# Patient Record
Sex: FEMALE | Race: BLACK OR AFRICAN AMERICAN | Employment: FULL TIME
[De-identification: names, ages, dates, MRNs, and addresses within clinical notes are randomized per-mention and may not be internally consistent; named-entity substitution may affect disease eponyms.]

---

## 2017-01-20 ENCOUNTER — TELEPHONE (OUTPATIENT)
Dept: PULMONOLOGY | Facility: CLINIC | Age: 42
End: 2017-01-20

## 2017-03-29 ENCOUNTER — TELEPHONE (OUTPATIENT)
Dept: BARIATRICS/WEIGHT MGMT | Age: 42
End: 2017-03-29

## 2017-09-14 ENCOUNTER — HOSPITAL ENCOUNTER (EMERGENCY)
Age: 42
Discharge: HOME | End: 2017-09-15
Attending: EMERGENCY MEDICINE
Payer: COMMERCIAL

## 2017-09-14 VITALS
SYSTOLIC BLOOD PRESSURE: 150 MMHG | WEIGHT: 191.2 LBS | OXYGEN SATURATION: 98 % | HEIGHT: 64 IN | HEART RATE: 74 BPM | TEMPERATURE: 99 F | DIASTOLIC BLOOD PRESSURE: 85 MMHG | BODY MASS INDEX: 32.64 KG/M2 | RESPIRATION RATE: 18 BRPM

## 2017-09-14 DIAGNOSIS — R10.9 ABDOMINAL PAIN, UNSPECIFIED LOCATION: ICD-10-CM

## 2017-09-14 DIAGNOSIS — K52.9 GASTROENTERITIS: ICD-10-CM

## 2017-09-14 DIAGNOSIS — R51.9 HEADACHE, UNSPECIFIED HEADACHE TYPE: Primary | ICD-10-CM

## 2017-09-14 LAB
CHP ED QC CHECK: YES
PREGNANCY TEST URINE, POC: NEGATIVE

## 2017-09-14 PROCEDURE — 80048 BASIC METABOLIC PNL TOTAL CA: CPT

## 2017-09-14 PROCEDURE — 2500000003 HC RX 250 WO HCPCS: Performed by: NURSE PRACTITIONER

## 2017-09-14 PROCEDURE — 96375 TX/PRO/DX INJ NEW DRUG ADDON: CPT

## 2017-09-14 PROCEDURE — 85025 COMPLETE CBC W/AUTO DIFF WBC: CPT

## 2017-09-14 PROCEDURE — 99284 EMERGENCY DEPT VISIT MOD MDM: CPT

## 2017-09-14 PROCEDURE — 6360000002 HC RX W HCPCS: Performed by: NURSE PRACTITIONER

## 2017-09-14 PROCEDURE — S0028 INJECTION, FAMOTIDINE, 20 MG: HCPCS | Performed by: NURSE PRACTITIONER

## 2017-09-14 PROCEDURE — 2580000003 HC RX 258: Performed by: NURSE PRACTITIONER

## 2017-09-14 PROCEDURE — 81001 URINALYSIS AUTO W/SCOPE: CPT

## 2017-09-14 PROCEDURE — 84703 CHORIONIC GONADOTROPIN ASSAY: CPT

## 2017-09-14 PROCEDURE — 96374 THER/PROPH/DIAG INJ IV PUSH: CPT

## 2017-09-14 RX ORDER — ONDANSETRON 2 MG/ML
8 INJECTION INTRAMUSCULAR; INTRAVENOUS ONCE
Status: COMPLETED | OUTPATIENT
Start: 2017-09-14 | End: 2017-09-14

## 2017-09-14 RX ORDER — 0.9 % SODIUM CHLORIDE 0.9 %
1000 INTRAVENOUS SOLUTION INTRAVENOUS ONCE
Status: COMPLETED | OUTPATIENT
Start: 2017-09-14 | End: 2017-09-15

## 2017-09-14 RX ADMIN — ONDANSETRON 8 MG: 2 INJECTION INTRAMUSCULAR; INTRAVENOUS at 23:48

## 2017-09-14 RX ADMIN — FAMOTIDINE 20 MG: 10 INJECTION, SOLUTION INTRAVENOUS at 23:49

## 2017-09-14 RX ADMIN — SODIUM CHLORIDE 1000 ML: 9 INJECTION, SOLUTION INTRAVENOUS at 23:48

## 2017-09-14 ASSESSMENT — ENCOUNTER SYMPTOMS
BACK PAIN: 0
SHORTNESS OF BREATH: 0
ABDOMINAL PAIN: 1
DIARRHEA: 1
NAUSEA: 1
COUGH: 0
VOMITING: 1
CONSTIPATION: 0
PHOTOPHOBIA: 0
SINUS PRESSURE: 0

## 2017-09-14 ASSESSMENT — PAIN SCALES - GENERAL: PAINLEVEL_OUTOF10: 8

## 2017-09-14 ASSESSMENT — PAIN DESCRIPTION - PAIN TYPE: TYPE: ACUTE PAIN

## 2017-09-14 ASSESSMENT — PAIN DESCRIPTION - LOCATION: LOCATION: ABDOMEN;HEAD

## 2017-09-14 ASSESSMENT — PAIN DESCRIPTION - ORIENTATION: ORIENTATION: MID

## 2017-09-14 ASSESSMENT — PAIN DESCRIPTION - DESCRIPTORS: DESCRIPTORS: ACHING;DISCOMFORT

## 2017-09-15 LAB
-: ABNORMAL
ABSOLUTE EOS #: 0.1 K/UL (ref 0–0.4)
ABSOLUTE LYMPH #: 1.5 K/UL (ref 1–4.8)
ABSOLUTE MONO #: 0.6 K/UL (ref 0.2–0.8)
AMORPHOUS: ABNORMAL
ANION GAP SERPL CALCULATED.3IONS-SCNC: 15 MMOL/L (ref 9–17)
BACTERIA: ABNORMAL
BASOPHILS # BLD: 0 %
BASOPHILS ABSOLUTE: 0 K/UL (ref 0–0.2)
BILIRUBIN URINE: NEGATIVE
BUN BLDV-MCNC: 14 MG/DL (ref 6–20)
BUN/CREAT BLD: 16 (ref 9–20)
CALCIUM SERPL-MCNC: 8.7 MG/DL (ref 8.6–10.4)
CASTS UA: ABNORMAL /LPF
CHLORIDE BLD-SCNC: 102 MMOL/L (ref 98–107)
CO2: 21 MMOL/L (ref 20–31)
COLOR: YELLOW
COMMENT UA: ABNORMAL
CREAT SERPL-MCNC: 0.89 MG/DL (ref 0.5–0.9)
CRYSTALS, UA: ABNORMAL /HPF
DIFFERENTIAL TYPE: ABNORMAL
EOSINOPHILS RELATIVE PERCENT: 2 %
EPITHELIAL CELLS UA: ABNORMAL /HPF
GFR AFRICAN AMERICAN: >60 ML/MIN
GFR NON-AFRICAN AMERICAN: >60 ML/MIN
GFR SERPL CREATININE-BSD FRML MDRD: NORMAL ML/MIN/{1.73_M2}
GFR SERPL CREATININE-BSD FRML MDRD: NORMAL ML/MIN/{1.73_M2}
GLUCOSE BLD-MCNC: 89 MG/DL (ref 70–99)
GLUCOSE URINE: NEGATIVE
HCG, PREGNANCY URINE (POC): NEGATIVE
HCT VFR BLD CALC: 34.5 % (ref 36–46)
HEMOGLOBIN: 11.9 G/DL (ref 12–16)
KETONES, URINE: NEGATIVE
LEUKOCYTE ESTERASE, URINE: NEGATIVE
LYMPHOCYTES # BLD: 31 %
MCH RBC QN AUTO: 26.9 PG (ref 26–34)
MCHC RBC AUTO-ENTMCNC: 34.4 G/DL (ref 31–37)
MCV RBC AUTO: 78.1 FL (ref 80–100)
MONOCYTES # BLD: 13 %
MUCUS: ABNORMAL
NITRITE, URINE: NEGATIVE
OTHER OBSERVATIONS UA: ABNORMAL
PDW BLD-RTO: 13.4 % (ref 11.5–14.5)
PH UA: 5.5 (ref 5–8)
PLATELET # BLD: 233 K/UL (ref 130–400)
PLATELET ESTIMATE: ABNORMAL
PMV BLD AUTO: ABNORMAL FL (ref 6–12)
POTASSIUM SERPL-SCNC: 3.8 MMOL/L (ref 3.7–5.3)
PROTEIN UA: ABNORMAL
RBC # BLD: 4.42 M/UL (ref 4–5.2)
RBC # BLD: ABNORMAL 10*6/UL
RBC UA: ABNORMAL /HPF (ref 0–2)
RENAL EPITHELIAL, UA: ABNORMAL /HPF
SEG NEUTROPHILS: 54 %
SEGMENTED NEUTROPHILS ABSOLUTE COUNT: 2.7 K/UL (ref 1.8–7.7)
SODIUM BLD-SCNC: 138 MMOL/L (ref 135–144)
SPECIFIC GRAVITY UA: 1.04 (ref 1–1.03)
TRICHOMONAS: ABNORMAL
TURBIDITY: CLEAR
URINE HGB: NEGATIVE
UROBILINOGEN, URINE: NORMAL
WBC # BLD: 4.9 K/UL (ref 3.5–11)
WBC # BLD: ABNORMAL 10*3/UL
WBC UA: ABNORMAL /HPF (ref 0–5)
YEAST: ABNORMAL

## 2017-09-15 PROCEDURE — 87086 URINE CULTURE/COLONY COUNT: CPT

## 2017-09-15 PROCEDURE — 6360000002 HC RX W HCPCS: Performed by: EMERGENCY MEDICINE

## 2017-09-15 PROCEDURE — 96375 TX/PRO/DX INJ NEW DRUG ADDON: CPT

## 2017-09-15 RX ORDER — DIPHENHYDRAMINE HYDROCHLORIDE 50 MG/ML
25 INJECTION INTRAMUSCULAR; INTRAVENOUS ONCE
Status: COMPLETED | OUTPATIENT
Start: 2017-09-15 | End: 2017-09-15

## 2017-09-15 RX ORDER — ONDANSETRON 4 MG/1
4 TABLET, FILM COATED ORAL EVERY 8 HOURS PRN
Qty: 20 TABLET | Refills: 0 | Status: SHIPPED | OUTPATIENT
Start: 2017-09-15 | End: 2017-09-22 | Stop reason: SDUPTHER

## 2017-09-15 RX ORDER — LOPERAMIDE HYDROCHLORIDE 2 MG/1
2 CAPSULE ORAL 4 TIMES DAILY PRN
Qty: 10 CAPSULE | Refills: 0 | Status: SHIPPED | OUTPATIENT
Start: 2017-09-15 | End: 2017-09-25

## 2017-09-15 RX ORDER — METOCLOPRAMIDE HYDROCHLORIDE 5 MG/ML
10 INJECTION INTRAMUSCULAR; INTRAVENOUS ONCE
Status: COMPLETED | OUTPATIENT
Start: 2017-09-15 | End: 2017-09-15

## 2017-09-15 RX ORDER — ONDANSETRON 4 MG/1
4 TABLET, ORALLY DISINTEGRATING ORAL EVERY 8 HOURS PRN
Qty: 20 TABLET | Refills: 0 | Status: SHIPPED | OUTPATIENT
Start: 2017-09-15 | End: 2018-02-08 | Stop reason: ALTCHOICE

## 2017-09-15 RX ORDER — KETOROLAC TROMETHAMINE 30 MG/ML
30 INJECTION, SOLUTION INTRAMUSCULAR; INTRAVENOUS ONCE
Status: COMPLETED | OUTPATIENT
Start: 2017-09-15 | End: 2017-09-15

## 2017-09-15 RX ADMIN — METOCLOPRAMIDE 10 MG: 5 INJECTION, SOLUTION INTRAMUSCULAR; INTRAVENOUS at 00:48

## 2017-09-15 RX ADMIN — KETOROLAC TROMETHAMINE 30 MG: 30 INJECTION, SOLUTION INTRAMUSCULAR at 00:47

## 2017-09-15 RX ADMIN — DIPHENHYDRAMINE HYDROCHLORIDE 25 MG: 50 INJECTION, SOLUTION INTRAMUSCULAR; INTRAVENOUS at 00:48

## 2017-09-15 ASSESSMENT — PAIN SCALES - GENERAL
PAINLEVEL_OUTOF10: 4
PAINLEVEL_OUTOF10: 2

## 2017-09-16 LAB
CULTURE: NORMAL
CULTURE: NORMAL
Lab: NORMAL
SPECIMEN DESCRIPTION: NORMAL
SPECIMEN DESCRIPTION: NORMAL
STATUS: NORMAL

## 2017-09-22 ENCOUNTER — OFFICE VISIT (OUTPATIENT)
Dept: INTERNAL MEDICINE CLINIC | Age: 42
End: 2017-09-22
Payer: COMMERCIAL

## 2017-09-22 ENCOUNTER — HOSPITAL ENCOUNTER (OUTPATIENT)
Age: 42
Setting detail: SPECIMEN
Discharge: HOME | End: 2017-09-22
Payer: COMMERCIAL

## 2017-09-22 VITALS
TEMPERATURE: 98 F | BODY MASS INDEX: 31.89 KG/M2 | HEART RATE: 68 BPM | SYSTOLIC BLOOD PRESSURE: 110 MMHG | OXYGEN SATURATION: 98 % | DIASTOLIC BLOOD PRESSURE: 70 MMHG | HEIGHT: 64 IN | WEIGHT: 186.8 LBS | RESPIRATION RATE: 16 BRPM

## 2017-09-22 DIAGNOSIS — E78.5 DYSLIPIDEMIA: ICD-10-CM

## 2017-09-22 DIAGNOSIS — M54.9 CHRONIC MIDLINE BACK PAIN, UNSPECIFIED BACK LOCATION: ICD-10-CM

## 2017-09-22 DIAGNOSIS — Z98.84 STATUS POST LAPAROSCOPIC SLEEVE GASTRECTOMY: ICD-10-CM

## 2017-09-22 DIAGNOSIS — I10 ESSENTIAL HYPERTENSION: Primary | ICD-10-CM

## 2017-09-22 DIAGNOSIS — G89.29 CHRONIC MIDLINE BACK PAIN, UNSPECIFIED BACK LOCATION: ICD-10-CM

## 2017-09-22 DIAGNOSIS — I10 ESSENTIAL HYPERTENSION: ICD-10-CM

## 2017-09-22 LAB
ABSOLUTE EOS #: 0.1 K/UL (ref 0–0.4)
ABSOLUTE LYMPH #: 1.6 K/UL (ref 1–4.8)
ABSOLUTE MONO #: 0.6 K/UL (ref 0.1–1.2)
ALBUMIN SERPL-MCNC: 4.2 G/DL (ref 3.5–5.2)
ALBUMIN/GLOBULIN RATIO: 1.3 (ref 1–2.5)
ALP BLD-CCNC: 52 U/L (ref 35–104)
ALT SERPL-CCNC: 18 U/L (ref 5–33)
ANION GAP SERPL CALCULATED.3IONS-SCNC: 13 MMOL/L (ref 9–17)
AST SERPL-CCNC: 18 U/L
BASOPHILS # BLD: 1 %
BASOPHILS ABSOLUTE: 0 K/UL (ref 0–0.2)
BILIRUB SERPL-MCNC: 0.33 MG/DL (ref 0.3–1.2)
BUN BLDV-MCNC: 8 MG/DL (ref 6–20)
BUN/CREAT BLD: ABNORMAL (ref 9–20)
CALCIUM SERPL-MCNC: 8.8 MG/DL (ref 8.6–10.4)
CHLORIDE BLD-SCNC: 107 MMOL/L (ref 98–107)
CHOLESTEROL/HDL RATIO: 2.7
CHOLESTEROL: 97 MG/DL
CO2: 22 MMOL/L (ref 20–31)
CREAT SERPL-MCNC: 0.94 MG/DL (ref 0.5–0.9)
CREATININE URINE: 322.4 MG/DL (ref 28–217)
DIFFERENTIAL TYPE: ABNORMAL
EOSINOPHILS RELATIVE PERCENT: 3 %
ESTIMATED AVERAGE GLUCOSE: 103 MG/DL
GFR AFRICAN AMERICAN: >60 ML/MIN
GFR NON-AFRICAN AMERICAN: >60 ML/MIN
GFR SERPL CREATININE-BSD FRML MDRD: ABNORMAL ML/MIN/{1.73_M2}
GFR SERPL CREATININE-BSD FRML MDRD: ABNORMAL ML/MIN/{1.73_M2}
GLUCOSE BLD-MCNC: 82 MG/DL (ref 70–99)
HBA1C MFR BLD: 5.2 % (ref 4–6)
HCT VFR BLD CALC: 34.9 % (ref 36–46)
HDLC SERPL-MCNC: 36 MG/DL
HEMOGLOBIN: 11.5 G/DL (ref 12–16)
HIV AG/AB: NONREACTIVE
LDL CHOLESTEROL: 50 MG/DL (ref 0–130)
LYMPHOCYTES # BLD: 34 %
MCH RBC QN AUTO: 25.9 PG (ref 26–34)
MCHC RBC AUTO-ENTMCNC: 33 G/DL (ref 31–37)
MCV RBC AUTO: 78.4 FL (ref 80–100)
MICROALBUMIN/CREAT 24H UR: <12 MG/L
MICROALBUMIN/CREAT UR-RTO: 4 MCG/MG CREAT
MONOCYTES # BLD: 12 %
PDW BLD-RTO: 15.4 % (ref 12.5–15.4)
PLATELET # BLD: 249 K/UL (ref 140–450)
PLATELET ESTIMATE: ABNORMAL
PMV BLD AUTO: 11.7 FL (ref 6–12)
POTASSIUM SERPL-SCNC: 4 MMOL/L (ref 3.7–5.3)
RBC # BLD: 4.45 M/UL (ref 4–5.2)
RBC # BLD: ABNORMAL 10*6/UL
SEG NEUTROPHILS: 50 %
SEGMENTED NEUTROPHILS ABSOLUTE COUNT: 2.4 K/UL (ref 1.8–7.7)
SODIUM BLD-SCNC: 142 MMOL/L (ref 135–144)
TOTAL PROTEIN: 7.5 G/DL (ref 6.4–8.3)
TRIGL SERPL-MCNC: 57 MG/DL
TSH SERPL DL<=0.05 MIU/L-ACNC: 0.72 MIU/L (ref 0.3–5)
VLDLC SERPL CALC-MCNC: ABNORMAL MG/DL (ref 1–30)
WBC # BLD: 4.8 K/UL (ref 3.5–11)
WBC # BLD: ABNORMAL 10*3/UL

## 2017-09-22 PROCEDURE — 99214 OFFICE O/P EST MOD 30 MIN: CPT | Performed by: FAMILY MEDICINE

## 2017-09-22 RX ORDER — ATORVASTATIN CALCIUM 10 MG/1
10 TABLET, FILM COATED ORAL DAILY
Qty: 30 TABLET | Refills: 1 | Status: SHIPPED | OUTPATIENT
Start: 2017-09-22 | End: 2018-04-23 | Stop reason: ALTCHOICE

## 2017-09-22 RX ORDER — METOPROLOL TARTRATE 50 MG/1
50 TABLET, FILM COATED ORAL DAILY
COMMUNITY
End: 2017-09-22 | Stop reason: SDUPTHER

## 2017-09-22 RX ORDER — PANTOPRAZOLE SODIUM 40 MG/1
40 TABLET, DELAYED RELEASE ORAL DAILY
COMMUNITY
End: 2017-09-22 | Stop reason: SDUPTHER

## 2017-09-22 RX ORDER — PANTOPRAZOLE SODIUM 40 MG/1
40 TABLET, DELAYED RELEASE ORAL DAILY
Qty: 30 TABLET | Refills: 1 | Status: SHIPPED | OUTPATIENT
Start: 2017-09-22 | End: 2018-04-23 | Stop reason: ALTCHOICE

## 2017-09-22 RX ORDER — METOPROLOL TARTRATE 50 MG/1
50 TABLET, FILM COATED ORAL DAILY
Qty: 30 TABLET | Refills: 1 | Status: SHIPPED | OUTPATIENT
Start: 2017-09-22 | End: 2018-04-23 | Stop reason: ALTCHOICE

## 2017-09-22 RX ORDER — ATORVASTATIN CALCIUM 10 MG/1
10 TABLET, FILM COATED ORAL DAILY
COMMUNITY
End: 2017-09-22 | Stop reason: SDUPTHER

## 2017-09-22 ASSESSMENT — ENCOUNTER SYMPTOMS
EYES NEGATIVE: 1
RESPIRATORY NEGATIVE: 1
ALLERGIC/IMMUNOLOGIC NEGATIVE: 1
GASTROINTESTINAL NEGATIVE: 1

## 2017-09-22 ASSESSMENT — PATIENT HEALTH QUESTIONNAIRE - PHQ9
SUM OF ALL RESPONSES TO PHQ QUESTIONS 1-9: 0
2. FEELING DOWN, DEPRESSED OR HOPELESS: 0
SUM OF ALL RESPONSES TO PHQ9 QUESTIONS 1 & 2: 0
1. LITTLE INTEREST OR PLEASURE IN DOING THINGS: 0

## 2017-10-04 ENCOUNTER — TELEPHONE (OUTPATIENT)
Dept: INTERNAL MEDICINE CLINIC | Age: 42
End: 2017-10-04

## 2017-10-04 NOTE — TELEPHONE ENCOUNTER
Patient called regarding the forms from [employer] that we can complete in the office and fax back.

## 2017-10-04 NOTE — TELEPHONE ENCOUNTER
Health Maintenance   Topic Date Due    Cervical cancer screen  10/16/2018 (Originally 10/17/1998)    Flu vaccine (1) 10/17/2018 (Originally 9/1/2017)    DTaP/Tdap/Td vaccine (2 - Td) 06/01/2026    HIV screen  Completed       Hemoglobin A1C (%)   Date Value   09/22/2017 5.2   06/01/2016 5.2   01/15/2016 5.9             ( goal A1C is < 7)   Microalb/Crt. Ratio (mcg/mg creat)   Date Value   09/22/2017 4     LDL Cholesterol (mg/dL)   Date Value   09/22/2017 50     LDL Calculated (mg/dL)   Date Value   07/16/2010 173 (A)       (goal LDL is <100)   AST (U/L)   Date Value   09/22/2017 18     ALT (U/L)   Date Value   09/22/2017 18     BUN (mg/dL)   Date Value   09/22/2017 8     BP Readings from Last 3 Encounters:   09/22/17 110/70   09/14/17 (!) 150/85   07/28/16 118/77          (goal 120/80)    All Future Testing planned in [facility]  Lab Frequency Next Occurrence       Next Visit Date:  Future Appointments  Date Time Provider Acosta Cook   1/22/2018 10:00 AM Darryl Sanchez MD Adult pc [address] [facility]            Patient Active Problem List:     Obesity (BMI 30-39. 9)     Dyslipidemia     Chronic lower back pain     Right knee pain     Osteoarthritis     Chest wall pain     Acute chest pain     Vitamin D deficiency     Essential hypertension     Abnormal EKG     Status post laparoscopic sleeve gastrectomy     Status post laparoscopic sleeve gastrectomy

## 2018-01-22 ENCOUNTER — OFFICE VISIT (OUTPATIENT)
Dept: INTERNAL MEDICINE CLINIC | Age: 43
End: 2018-01-22
Payer: COMMERCIAL

## 2018-01-22 VITALS
HEIGHT: 64 IN | OXYGEN SATURATION: 98 % | SYSTOLIC BLOOD PRESSURE: 120 MMHG | WEIGHT: 203.8 LBS | BODY MASS INDEX: 34.79 KG/M2 | DIASTOLIC BLOOD PRESSURE: 80 MMHG | HEART RATE: 83 BPM | RESPIRATION RATE: 18 BRPM

## 2018-01-22 DIAGNOSIS — R04.0 EPISTAXIS: Primary | ICD-10-CM

## 2018-01-22 DIAGNOSIS — K21.9 GASTROESOPHAGEAL REFLUX DISEASE WITHOUT ESOPHAGITIS: ICD-10-CM

## 2018-01-22 DIAGNOSIS — E66.9 OBESITY (BMI 30-39.9): ICD-10-CM

## 2018-01-22 DIAGNOSIS — E78.5 DYSLIPIDEMIA: ICD-10-CM

## 2018-01-22 DIAGNOSIS — Z98.84 STATUS POST LAPAROSCOPIC SLEEVE GASTRECTOMY: ICD-10-CM

## 2018-01-22 DIAGNOSIS — I10 ESSENTIAL HYPERTENSION: ICD-10-CM

## 2018-01-22 PROCEDURE — 99214 OFFICE O/P EST MOD 30 MIN: CPT | Performed by: FAMILY MEDICINE

## 2018-01-22 ASSESSMENT — ENCOUNTER SYMPTOMS
ALLERGIC/IMMUNOLOGIC NEGATIVE: 1
EYES NEGATIVE: 1
RESPIRATORY NEGATIVE: 1
GASTROINTESTINAL NEGATIVE: 1

## 2018-01-22 NOTE — PROGRESS NOTES
[address]   2. Dyslipidemia     3. Essential hypertension     4. Gastroesophageal reflux disease without esophagitis     5. Obesity (BMI 30-39.9)     6. Status post laparoscopic sleeve gastrectomy             Plan:      43-year-old overweight -American female is presented with subjective complaint of recurrent/chronic epistaxis that responds to nasal pressure. Denies history of presentation to the emergency room or decompensation. Clinical examination is benign. Physical examination is unremarkable. She is advised to refrain from aspirin or any anti-inflammatory drugs. She'll be scheduled with [facility]  She is normotensive random blood pressure less than 130/80. Continue beta blocker that she is tolerating well. Low-fat high-fiber diet, daily moderate exercise, consume less than 2 g of salt a day. Obesity. 2 years status post laparoscopic sleeve gastrectomy. Patient stated that she lost 60 pounds however regained. She is advised to reconnect with her bariatric surgeon. GERD stable on proton pump inhibitor  Hyperlipidemia. pharmacologically treated with statin. Depression screen is negative  She denies tobacco, excessive alcohol or illicit drug use  Available labs reviewed, discussed with patient, question answered. Med list reviewed advised to continue  This note is created with a voice recognition program and while intend to generate a document that accurately reflects the content of the visit, no guarantee can be provided that every mistake has been identified and corrected by editing.

## 2018-02-08 ENCOUNTER — OFFICE VISIT (OUTPATIENT)
Dept: BARIATRICS/WEIGHT MGMT | Age: 43
End: 2018-02-08
Payer: COMMERCIAL

## 2018-02-08 VITALS
BODY MASS INDEX: 33.32 KG/M2 | HEIGHT: 65 IN | WEIGHT: 200 LBS | HEART RATE: 80 BPM | SYSTOLIC BLOOD PRESSURE: 110 MMHG | DIASTOLIC BLOOD PRESSURE: 80 MMHG

## 2018-02-08 DIAGNOSIS — Z98.84 STATUS POST BARIATRIC SURGERY: ICD-10-CM

## 2018-02-08 DIAGNOSIS — I10 ESSENTIAL HYPERTENSION: Primary | ICD-10-CM

## 2018-02-08 PROCEDURE — G8427 DOCREV CUR MEDS BY ELIG CLIN: HCPCS | Performed by: SURGERY

## 2018-02-08 PROCEDURE — 1036F TOBACCO NON-USER: CPT | Performed by: SURGERY

## 2018-02-08 PROCEDURE — 99213 OFFICE O/P EST LOW 20 MIN: CPT | Performed by: SURGERY

## 2018-02-08 PROCEDURE — G8417 CALC BMI ABV UP PARAM F/U: HCPCS | Performed by: SURGERY

## 2018-02-08 PROCEDURE — G8484 FLU IMMUNIZE NO ADMIN: HCPCS | Performed by: SURGERY

## 2018-02-11 NOTE — PROGRESS NOTES
Medical Nutrition Therapy  Metabolic and Bariatric surgery  Annual follow up note    approx 2 years post op        Pt reports: Gaining weight due to working overtime so did not take the time to continue healthy behaviors; was able to identify the things that worked to help support healthy lifestyle and weight management; unable to identify bariatric behaviors that will help her surgery work for her; not taking MVI or calcium; Lowest weight 170lb per pt;         Vitals:   Vitals:    02/08/18 1112   BP: 110/80   Pulse: 80   Weight: 200 lb (90.7 kg)   Height: 5' 5\" (1.651 m)      Body mass index is 33.28 kg/m². Labs reviewed:     Multivitamin/mineral intake:none  Calcium intake:   none  Other:    goals for next visit  I will read the nutrition section of the binder that was provided to me today. I will start taking my MVI and calcium. I will prepare meals for the week on sat/sun. I will only eat foods I bring from home during my work day. Nutrition Assessment:   PES: Inadequate food and beverage intake r/t WLS as evidenced by loss of excess body weight lost 0 lbs over 1. Goals   60-80gm of protein  48-64oz of fluid  Vitamin adherance  Basic adherance to WLS behavious and this document has been scanned into the chart.        [] met     [x]  Not met        Plan:Offered pt closer follow up as desired and she will schedule that on her way out; otherwise follow up in one month     F/u annually         Georges Miller
intact. Psychiatric: The patient has a normal mood and affect. Speech is normal and behavior is normal. Judgment and thought content normal. Cognition and memory are normal.     Assessment & Plan:      1. Essential hypertension    2. Status post bariatric surgery            [] Actigal: [] NA   [x] S/p Cholecystectomy  [] Continue 3 months post-op  [x] Finished    [x] Advance Diet    [x] Daily protein (65-75gm/day)   [x] Take Vitamins   [x] Attend Support Group    [x] Exercise Regularly     [x] Use contraception:    [] NA    [] s/p Hysterectomy   [] s/p Tubal ligation   [] Other:     Meet with dietician    Discussed bariatric diet with patient    Discussed exercise with patient    Check labs    Return in 3 months    Follow up: Return in about 3 months (around 5/8/2018) for Post-Op. Orders placed this encounter:   Orders Placed This Encounter   Procedures    Zinc    Comprehensive Metabolic Panel    TSH without Reflex    Hemoglobin A1C    Vitamin B12 & Folate    Vitamin B1    Vitamin D 25 Hydroxy    Magnesium    Iron and TIBC    Vitamin A    CBC Auto Differential       New Prescriptions:   No orders of the defined types were placed in this encounter. Electronically signed by Bita Lott DO on 2/11/2018 at 5:57 PM    Please note that this chart was generated using voice recognition Dragon dictation software. Although every effort was made to ensure the accuracy of this automated transcription, some errors in transcription may have occurred.

## 2018-02-21 ENCOUNTER — OFFICE VISIT (OUTPATIENT)
Dept: OTOLARYNGOLOGY | Age: 43
End: 2018-02-21
Payer: COMMERCIAL

## 2018-02-21 VITALS
HEIGHT: 64 IN | DIASTOLIC BLOOD PRESSURE: 86 MMHG | HEART RATE: 74 BPM | WEIGHT: 197.8 LBS | BODY MASS INDEX: 33.77 KG/M2 | SYSTOLIC BLOOD PRESSURE: 126 MMHG

## 2018-02-21 DIAGNOSIS — R04.0 EPISTAXIS: Primary | ICD-10-CM

## 2018-02-21 PROCEDURE — 99203 OFFICE O/P NEW LOW 30 MIN: CPT | Performed by: OTOLARYNGOLOGY

## 2018-02-22 ENCOUNTER — NURSE ONLY (OUTPATIENT)
Dept: BARIATRICS/WEIGHT MGMT | Age: 43
End: 2018-02-22

## 2018-02-22 VITALS — WEIGHT: 193 LBS | BODY MASS INDEX: 33.13 KG/M2

## 2018-03-08 ENCOUNTER — TELEPHONE (OUTPATIENT)
Dept: BARIATRICS/WEIGHT MGMT | Age: 43
End: 2018-03-08

## 2018-04-23 ENCOUNTER — HOSPITAL ENCOUNTER (OUTPATIENT)
Age: 43
Discharge: HOME | End: 2018-04-23
Payer: COMMERCIAL

## 2018-04-23 ENCOUNTER — OFFICE VISIT (OUTPATIENT)
Dept: INTERNAL MEDICINE CLINIC | Age: 43
End: 2018-04-23
Payer: COMMERCIAL

## 2018-04-23 VITALS
SYSTOLIC BLOOD PRESSURE: 128 MMHG | OXYGEN SATURATION: 99 % | RESPIRATION RATE: 20 BRPM | WEIGHT: 195 LBS | DIASTOLIC BLOOD PRESSURE: 80 MMHG | BODY MASS INDEX: 32.49 KG/M2 | HEART RATE: 81 BPM | HEIGHT: 65 IN

## 2018-04-23 DIAGNOSIS — Z98.84 STATUS POST BARIATRIC SURGERY: ICD-10-CM

## 2018-04-23 DIAGNOSIS — E78.5 DYSLIPIDEMIA: Primary | ICD-10-CM

## 2018-04-23 DIAGNOSIS — E55.9 VITAMIN D DEFICIENCY: ICD-10-CM

## 2018-04-23 DIAGNOSIS — Z98.84 STATUS POST LAPAROSCOPIC SLEEVE GASTRECTOMY: ICD-10-CM

## 2018-04-23 DIAGNOSIS — I10 ESSENTIAL HYPERTENSION: ICD-10-CM

## 2018-04-23 DIAGNOSIS — M54.9 MUSCULOSKELETAL BACK PAIN: ICD-10-CM

## 2018-04-23 LAB
ABSOLUTE EOS #: 0.08 K/UL (ref 0–0.44)
ABSOLUTE IMMATURE GRANULOCYTE: <0.03 K/UL (ref 0–0.3)
ABSOLUTE LYMPH #: 0.94 K/UL (ref 1.1–3.7)
ABSOLUTE MONO #: 0.41 K/UL (ref 0.1–1.2)
ALBUMIN SERPL-MCNC: 4.1 G/DL (ref 3.5–5.2)
ALBUMIN/GLOBULIN RATIO: 1 (ref 1–2.5)
ALP BLD-CCNC: 57 U/L (ref 35–104)
ALT SERPL-CCNC: 15 U/L (ref 5–33)
ANION GAP SERPL CALCULATED.3IONS-SCNC: 13 MMOL/L (ref 9–17)
AST SERPL-CCNC: 18 U/L
BASOPHILS # BLD: 1 % (ref 0–2)
BASOPHILS ABSOLUTE: <0.03 K/UL (ref 0–0.2)
BILIRUB SERPL-MCNC: 0.37 MG/DL (ref 0.3–1.2)
BUN BLDV-MCNC: 10 MG/DL (ref 6–20)
BUN/CREAT BLD: NORMAL (ref 9–20)
CALCIUM SERPL-MCNC: 9 MG/DL (ref 8.6–10.4)
CHLORIDE BLD-SCNC: 102 MMOL/L (ref 98–107)
CO2: 24 MMOL/L (ref 20–31)
CREAT SERPL-MCNC: 0.86 MG/DL (ref 0.5–0.9)
DIFFERENTIAL TYPE: ABNORMAL
EOSINOPHILS RELATIVE PERCENT: 3 % (ref 1–4)
ESTIMATED AVERAGE GLUCOSE: 108 MG/DL
FOLATE: 19.1 NG/ML
GFR AFRICAN AMERICAN: >60 ML/MIN
GFR NON-AFRICAN AMERICAN: >60 ML/MIN
GFR SERPL CREATININE-BSD FRML MDRD: NORMAL ML/MIN/{1.73_M2}
GFR SERPL CREATININE-BSD FRML MDRD: NORMAL ML/MIN/{1.73_M2}
GLUCOSE BLD-MCNC: 80 MG/DL (ref 70–99)
HBA1C MFR BLD: 5.4 % (ref 4–6)
HCT VFR BLD CALC: 36.9 % (ref 36.3–47.1)
HEMOGLOBIN: 11.6 G/DL (ref 11.9–15.1)
IMMATURE GRANULOCYTES: 0 %
IRON SATURATION: 14 % (ref 20–55)
IRON: 45 UG/DL (ref 37–145)
LYMPHOCYTES # BLD: 32 % (ref 24–43)
MAGNESIUM: 2.1 MG/DL (ref 1.6–2.6)
MCH RBC QN AUTO: 25.5 PG (ref 25.2–33.5)
MCHC RBC AUTO-ENTMCNC: 31.4 G/DL (ref 28.4–34.8)
MCV RBC AUTO: 81.1 FL (ref 82.6–102.9)
MONOCYTES # BLD: 14 % (ref 3–12)
NRBC AUTOMATED: 0 PER 100 WBC
PDW BLD-RTO: 14.1 % (ref 11.8–14.4)
PLATELET # BLD: 224 K/UL (ref 138–453)
PLATELET ESTIMATE: ABNORMAL
PMV BLD AUTO: 11.2 FL (ref 8.1–13.5)
POTASSIUM SERPL-SCNC: 3.7 MMOL/L (ref 3.7–5.3)
RBC # BLD: 4.55 M/UL (ref 3.95–5.11)
RBC # BLD: ABNORMAL 10*6/UL
SEG NEUTROPHILS: 50 % (ref 36–65)
SEGMENTED NEUTROPHILS ABSOLUTE COUNT: 1.47 K/UL (ref 1.5–8.1)
SODIUM BLD-SCNC: 139 MMOL/L (ref 135–144)
TOTAL IRON BINDING CAPACITY: 330 UG/DL (ref 250–450)
TOTAL PROTEIN: 8.2 G/DL (ref 6.4–8.3)
TSH SERPL DL<=0.05 MIU/L-ACNC: 0.49 MIU/L (ref 0.3–5)
UNSATURATED IRON BINDING CAPACITY: 285 UG/DL (ref 112–347)
VITAMIN B-12: 974 PG/ML (ref 232–1245)
VITAMIN D 25-HYDROXY: 11.8 NG/ML (ref 30–100)
WBC # BLD: 2.9 K/UL (ref 3.5–11.3)
WBC # BLD: ABNORMAL 10*3/UL

## 2018-04-23 PROCEDURE — G8427 DOCREV CUR MEDS BY ELIG CLIN: HCPCS | Performed by: FAMILY MEDICINE

## 2018-04-23 PROCEDURE — 82306 VITAMIN D 25 HYDROXY: CPT

## 2018-04-23 PROCEDURE — 36415 COLL VENOUS BLD VENIPUNCTURE: CPT

## 2018-04-23 PROCEDURE — 83550 IRON BINDING TEST: CPT

## 2018-04-23 PROCEDURE — 82607 VITAMIN B-12: CPT

## 2018-04-23 PROCEDURE — 84590 ASSAY OF VITAMIN A: CPT

## 2018-04-23 PROCEDURE — 80053 COMPREHEN METABOLIC PANEL: CPT

## 2018-04-23 PROCEDURE — 84425 ASSAY OF VITAMIN B-1: CPT

## 2018-04-23 PROCEDURE — 83735 ASSAY OF MAGNESIUM: CPT

## 2018-04-23 PROCEDURE — 84443 ASSAY THYROID STIM HORMONE: CPT

## 2018-04-23 PROCEDURE — 84630 ASSAY OF ZINC: CPT

## 2018-04-23 PROCEDURE — 83540 ASSAY OF IRON: CPT

## 2018-04-23 PROCEDURE — 83036 HEMOGLOBIN GLYCOSYLATED A1C: CPT

## 2018-04-23 PROCEDURE — 99213 OFFICE O/P EST LOW 20 MIN: CPT | Performed by: FAMILY MEDICINE

## 2018-04-23 PROCEDURE — 85025 COMPLETE CBC W/AUTO DIFF WBC: CPT

## 2018-04-23 PROCEDURE — 1036F TOBACCO NON-USER: CPT | Performed by: FAMILY MEDICINE

## 2018-04-23 PROCEDURE — 82746 ASSAY OF FOLIC ACID SERUM: CPT

## 2018-04-23 PROCEDURE — G8417 CALC BMI ABV UP PARAM F/U: HCPCS | Performed by: FAMILY MEDICINE

## 2018-04-23 ASSESSMENT — ENCOUNTER SYMPTOMS
ALLERGIC/IMMUNOLOGIC NEGATIVE: 1
GASTROINTESTINAL NEGATIVE: 1
EYES NEGATIVE: 1
RESPIRATORY NEGATIVE: 1

## 2018-04-25 LAB — ZINC: 84 UG/DL (ref 60–120)

## 2018-04-25 RX ORDER — ERGOCALCIFEROL 1.25 MG/1
50000 CAPSULE ORAL WEEKLY
Qty: 8 CAPSULE | Refills: 0 | Status: SHIPPED | OUTPATIENT
Start: 2018-04-25 | End: 2018-06-08 | Stop reason: SDUPTHER

## 2018-04-26 LAB
RETINYL PALMITATE: <0.02 MG/L (ref 0–0.1)
VITAMIN A LEVEL: 0.33 MG/L (ref 0.3–1.2)
VITAMIN A, INTERP: NORMAL

## 2018-04-28 LAB — VITAMIN B1 WHOLE BLOOD: 43 NMOL/L (ref 70–180)

## 2018-05-08 ENCOUNTER — APPOINTMENT (OUTPATIENT)
Dept: GENERAL RADIOLOGY | Age: 43
End: 2018-05-08
Payer: COMMERCIAL

## 2018-05-08 ENCOUNTER — HOSPITAL ENCOUNTER (EMERGENCY)
Age: 43
Discharge: HOME | End: 2018-05-08
Attending: EMERGENCY MEDICINE
Payer: COMMERCIAL

## 2018-05-08 VITALS
TEMPERATURE: 99.8 F | DIASTOLIC BLOOD PRESSURE: 88 MMHG | OXYGEN SATURATION: 100 % | HEART RATE: 75 BPM | BODY MASS INDEX: 32.44 KG/M2 | SYSTOLIC BLOOD PRESSURE: 153 MMHG | HEIGHT: 64 IN | WEIGHT: 190 LBS | RESPIRATION RATE: 20 BRPM

## 2018-05-08 DIAGNOSIS — R07.89 CHEST WALL PAIN: Primary | ICD-10-CM

## 2018-05-08 LAB
ABSOLUTE EOS #: 0.13 K/UL (ref 0–0.44)
ABSOLUTE IMMATURE GRANULOCYTE: <0.03 K/UL (ref 0–0.3)
ABSOLUTE LYMPH #: 1.21 K/UL (ref 1.1–3.7)
ABSOLUTE MONO #: 0.46 K/UL (ref 0.1–1.2)
ANION GAP SERPL CALCULATED.3IONS-SCNC: 8 MMOL/L (ref 9–17)
BASOPHILS # BLD: 1 % (ref 0–2)
BASOPHILS ABSOLUTE: <0.03 K/UL (ref 0–0.2)
BUN BLDV-MCNC: 10 MG/DL (ref 6–20)
BUN/CREAT BLD: ABNORMAL (ref 9–20)
CALCIUM SERPL-MCNC: 8.9 MG/DL (ref 8.6–10.4)
CHLORIDE BLD-SCNC: 108 MMOL/L (ref 98–107)
CO2: 26 MMOL/L (ref 20–31)
CREAT SERPL-MCNC: 0.81 MG/DL (ref 0.5–0.9)
D-DIMER QUANTITATIVE: 0.18 MG/L FEU
DIFFERENTIAL TYPE: ABNORMAL
EKG ATRIAL RATE: 92 BPM
EKG P AXIS: 37 DEGREES
EKG P-R INTERVAL: 140 MS
EKG Q-T INTERVAL: 378 MS
EKG QRS DURATION: 84 MS
EKG QTC CALCULATION (BAZETT): 467 MS
EKG R AXIS: 38 DEGREES
EKG T AXIS: 31 DEGREES
EKG VENTRICULAR RATE: 92 BPM
EOSINOPHILS RELATIVE PERCENT: 4 % (ref 1–4)
GFR AFRICAN AMERICAN: >60 ML/MIN
GFR NON-AFRICAN AMERICAN: >60 ML/MIN
GFR SERPL CREATININE-BSD FRML MDRD: ABNORMAL ML/MIN/{1.73_M2}
GFR SERPL CREATININE-BSD FRML MDRD: ABNORMAL ML/MIN/{1.73_M2}
GLUCOSE BLD-MCNC: 102 MG/DL (ref 70–99)
HCT VFR BLD CALC: 32.2 % (ref 36.3–47.1)
HEMOGLOBIN: 10.1 G/DL (ref 11.9–15.1)
IMMATURE GRANULOCYTES: 0 %
LYMPHOCYTES # BLD: 34 % (ref 24–43)
MCH RBC QN AUTO: 26.3 PG (ref 25.2–33.5)
MCHC RBC AUTO-ENTMCNC: 31.4 G/DL (ref 28.4–34.8)
MCV RBC AUTO: 83.9 FL (ref 82.6–102.9)
MONOCYTES # BLD: 13 % (ref 3–12)
NRBC AUTOMATED: 0 PER 100 WBC
PDW BLD-RTO: 14.3 % (ref 11.8–14.4)
PLATELET # BLD: 212 K/UL (ref 138–453)
PLATELET ESTIMATE: ABNORMAL
PMV BLD AUTO: 11.7 FL (ref 8.1–13.5)
POC TROPONIN I: 0 NG/ML (ref 0–0.1)
POC TROPONIN I: 0 NG/ML (ref 0–0.1)
POC TROPONIN INTERP: NORMAL
POC TROPONIN INTERP: NORMAL
POTASSIUM SERPL-SCNC: 3.8 MMOL/L (ref 3.7–5.3)
RBC # BLD: 3.84 M/UL (ref 3.95–5.11)
RBC # BLD: ABNORMAL 10*6/UL
SEG NEUTROPHILS: 48 % (ref 36–65)
SEGMENTED NEUTROPHILS ABSOLUTE COUNT: 1.7 K/UL (ref 1.5–8.1)
SODIUM BLD-SCNC: 142 MMOL/L (ref 135–144)
WBC # BLD: 3.5 K/UL (ref 3.5–11.3)
WBC # BLD: ABNORMAL 10*3/UL

## 2018-05-08 PROCEDURE — 85025 COMPLETE CBC W/AUTO DIFF WBC: CPT

## 2018-05-08 PROCEDURE — 99285 EMERGENCY DEPT VISIT HI MDM: CPT

## 2018-05-08 PROCEDURE — 85379 FIBRIN DEGRADATION QUANT: CPT

## 2018-05-08 PROCEDURE — 80048 BASIC METABOLIC PNL TOTAL CA: CPT

## 2018-05-08 PROCEDURE — 93005 ELECTROCARDIOGRAM TRACING: CPT

## 2018-05-08 PROCEDURE — 6370000000 HC RX 637 (ALT 250 FOR IP): Performed by: NURSE PRACTITIONER

## 2018-05-08 PROCEDURE — 71046 X-RAY EXAM CHEST 2 VIEWS: CPT

## 2018-05-08 PROCEDURE — 84484 ASSAY OF TROPONIN QUANT: CPT

## 2018-05-08 RX ORDER — IBUPROFEN 800 MG/1
800 TABLET ORAL ONCE
Status: COMPLETED | OUTPATIENT
Start: 2018-05-08 | End: 2018-05-08

## 2018-05-08 RX ORDER — IBUPROFEN 800 MG/1
800 TABLET ORAL EVERY 8 HOURS PRN
Qty: 30 TABLET | Refills: 0 | Status: SHIPPED | OUTPATIENT
Start: 2018-05-08 | End: 2018-08-17 | Stop reason: ALTCHOICE

## 2018-05-08 RX ADMIN — IBUPROFEN 800 MG: 800 TABLET ORAL at 23:36

## 2018-05-08 ASSESSMENT — PAIN DESCRIPTION - LOCATION: LOCATION: CHEST

## 2018-05-08 ASSESSMENT — ENCOUNTER SYMPTOMS
NAUSEA: 0
SHORTNESS OF BREATH: 0
ABDOMINAL PAIN: 0
COUGH: 0
VOMITING: 0

## 2018-05-08 ASSESSMENT — PAIN DESCRIPTION - PAIN TYPE: TYPE: ACUTE PAIN

## 2018-05-08 ASSESSMENT — PAIN SCALES - GENERAL
PAINLEVEL_OUTOF10: 8
PAINLEVEL_OUTOF10: 3

## 2018-05-08 ASSESSMENT — PAIN DESCRIPTION - ORIENTATION: ORIENTATION: LEFT;UPPER

## 2018-05-08 ASSESSMENT — PAIN DESCRIPTION - DESCRIPTORS: DESCRIPTORS: SHARP

## 2018-05-09 ENCOUNTER — OFFICE VISIT (OUTPATIENT)
Dept: BARIATRICS/WEIGHT MGMT | Age: 43
End: 2018-05-09
Payer: COMMERCIAL

## 2018-05-09 VITALS
SYSTOLIC BLOOD PRESSURE: 108 MMHG | HEIGHT: 64 IN | DIASTOLIC BLOOD PRESSURE: 78 MMHG | BODY MASS INDEX: 32.44 KG/M2 | WEIGHT: 190 LBS | RESPIRATION RATE: 20 BRPM | HEART RATE: 56 BPM

## 2018-05-09 DIAGNOSIS — Z98.84 STATUS POST BARIATRIC SURGERY: ICD-10-CM

## 2018-05-09 DIAGNOSIS — E51.9 THIAMINE DEFICIENCY: ICD-10-CM

## 2018-05-09 DIAGNOSIS — E55.9 VITAMIN D DEFICIENCY: ICD-10-CM

## 2018-05-09 DIAGNOSIS — I10 ESSENTIAL HYPERTENSION: Primary | ICD-10-CM

## 2018-05-09 PROCEDURE — 1036F TOBACCO NON-USER: CPT | Performed by: SURGERY

## 2018-05-09 PROCEDURE — G8427 DOCREV CUR MEDS BY ELIG CLIN: HCPCS | Performed by: SURGERY

## 2018-05-09 PROCEDURE — 99213 OFFICE O/P EST LOW 20 MIN: CPT | Performed by: SURGERY

## 2018-05-09 PROCEDURE — G8417 CALC BMI ABV UP PARAM F/U: HCPCS | Performed by: SURGERY

## 2018-05-21 ENCOUNTER — HOSPITAL ENCOUNTER (OUTPATIENT)
Age: 43
Setting detail: SPECIMEN
Discharge: HOME | End: 2018-05-21
Payer: COMMERCIAL

## 2018-05-21 ENCOUNTER — OFFICE VISIT (OUTPATIENT)
Dept: INTERNAL MEDICINE CLINIC | Age: 43
End: 2018-05-21
Payer: COMMERCIAL

## 2018-05-21 VITALS — HEIGHT: 64 IN | BODY MASS INDEX: 31.09 KG/M2 | WEIGHT: 182.1 LBS

## 2018-05-21 DIAGNOSIS — I10 ESSENTIAL HYPERTENSION: ICD-10-CM

## 2018-05-21 DIAGNOSIS — G89.29 CHRONIC LOW BACK PAIN WITHOUT SCIATICA, UNSPECIFIED BACK PAIN LATERALITY: ICD-10-CM

## 2018-05-21 DIAGNOSIS — N76.1 SUBACUTE VAGINITIS: Primary | ICD-10-CM

## 2018-05-21 DIAGNOSIS — Z98.890 HISTORY OF BILATERAL BREAST REDUCTION SURGERY: ICD-10-CM

## 2018-05-21 DIAGNOSIS — E66.9 OBESITY (BMI 30-39.9): ICD-10-CM

## 2018-05-21 DIAGNOSIS — Z98.84 STATUS POST LAPAROSCOPIC SLEEVE GASTRECTOMY: ICD-10-CM

## 2018-05-21 DIAGNOSIS — E55.9 VITAMIN D DEFICIENCY: ICD-10-CM

## 2018-05-21 DIAGNOSIS — M54.50 CHRONIC LOW BACK PAIN WITHOUT SCIATICA, UNSPECIFIED BACK PAIN LATERALITY: ICD-10-CM

## 2018-05-21 DIAGNOSIS — E78.5 DYSLIPIDEMIA: ICD-10-CM

## 2018-05-21 DIAGNOSIS — Z12.4 PAP SMEAR FOR CERVICAL CANCER SCREENING: ICD-10-CM

## 2018-05-21 LAB
CONTROL: NORMAL
PREGNANCY TEST URINE, POC: NEGATIVE

## 2018-05-21 PROCEDURE — 99214 OFFICE O/P EST MOD 30 MIN: CPT | Performed by: FAMILY MEDICINE

## 2018-05-21 PROCEDURE — 1036F TOBACCO NON-USER: CPT | Performed by: FAMILY MEDICINE

## 2018-05-21 PROCEDURE — 81025 URINE PREGNANCY TEST: CPT | Performed by: FAMILY MEDICINE

## 2018-05-21 PROCEDURE — G8427 DOCREV CUR MEDS BY ELIG CLIN: HCPCS | Performed by: FAMILY MEDICINE

## 2018-05-21 PROCEDURE — G8417 CALC BMI ABV UP PARAM F/U: HCPCS | Performed by: FAMILY MEDICINE

## 2018-05-21 ASSESSMENT — ENCOUNTER SYMPTOMS
ALLERGIC/IMMUNOLOGIC NEGATIVE: 1
RESPIRATORY NEGATIVE: 1
GASTROINTESTINAL NEGATIVE: 1
EYES NEGATIVE: 1

## 2018-05-23 LAB
CHLAMYDIA BY THIN PREP: NEGATIVE
N. GONORRHOEAE DNA, THIN PREP: NEGATIVE

## 2018-06-08 ENCOUNTER — OFFICE VISIT (OUTPATIENT)
Dept: FAMILY MEDICINE CLINIC | Age: 43
End: 2018-06-08
Payer: COMMERCIAL

## 2018-06-08 VITALS — BODY MASS INDEX: 32.95 KG/M2 | RESPIRATION RATE: 16 BRPM | WEIGHT: 193 LBS | HEIGHT: 64 IN

## 2018-06-08 DIAGNOSIS — Z12.39 SCREENING FOR BREAST CANCER: ICD-10-CM

## 2018-06-08 DIAGNOSIS — E55.9 VITAMIN D DEFICIENCY: ICD-10-CM

## 2018-06-08 DIAGNOSIS — E66.9 OBESITY (BMI 30-39.9): ICD-10-CM

## 2018-06-08 DIAGNOSIS — B37.9 CANDIDIASIS: Primary | ICD-10-CM

## 2018-06-08 PROCEDURE — 99204 OFFICE O/P NEW MOD 45 MIN: CPT | Performed by: FAMILY MEDICINE

## 2018-06-08 PROCEDURE — G8427 DOCREV CUR MEDS BY ELIG CLIN: HCPCS | Performed by: FAMILY MEDICINE

## 2018-06-08 PROCEDURE — 1036F TOBACCO NON-USER: CPT | Performed by: FAMILY MEDICINE

## 2018-06-08 PROCEDURE — G8417 CALC BMI ABV UP PARAM F/U: HCPCS | Performed by: FAMILY MEDICINE

## 2018-06-08 RX ORDER — PHENTERMINE HYDROCHLORIDE 37.5 MG/1
37.5 TABLET ORAL
Qty: 30 TABLET | Refills: 0 | Status: SHIPPED | OUTPATIENT
Start: 2018-06-08 | End: 2018-07-09 | Stop reason: SDUPTHER

## 2018-06-08 RX ORDER — TOPIRAMATE 25 MG/1
TABLET ORAL
Qty: 80 TABLET | Refills: 0 | Status: SHIPPED | OUTPATIENT
Start: 2018-06-08 | End: 2018-08-17 | Stop reason: ALTCHOICE

## 2018-06-08 RX ORDER — ERGOCALCIFEROL 1.25 MG/1
50000 CAPSULE ORAL WEEKLY
Qty: 8 CAPSULE | Refills: 11 | Status: SHIPPED | OUTPATIENT
Start: 2018-06-08 | End: 2018-11-16 | Stop reason: ALTCHOICE

## 2018-06-08 RX ORDER — NYSTATIN 100000 U/G
CREAM TOPICAL
Qty: 1 TUBE | Refills: 2 | Status: SHIPPED | OUTPATIENT
Start: 2018-06-08 | End: 2020-02-20

## 2018-06-08 ASSESSMENT — PATIENT HEALTH QUESTIONNAIRE - PHQ9
1. LITTLE INTEREST OR PLEASURE IN DOING THINGS: 0
2. FEELING DOWN, DEPRESSED OR HOPELESS: 0
SUM OF ALL RESPONSES TO PHQ9 QUESTIONS 1 & 2: 0
SUM OF ALL RESPONSES TO PHQ QUESTIONS 1-9: 0

## 2018-06-15 LAB — CYTOLOGY REPORT: NORMAL

## 2018-07-06 ENCOUNTER — HOSPITAL ENCOUNTER (OUTPATIENT)
Dept: MAMMOGRAPHY | Age: 43
Discharge: HOME | End: 2018-07-08
Payer: COMMERCIAL

## 2018-07-06 DIAGNOSIS — Z12.39 SCREENING BREAST EXAMINATION: ICD-10-CM

## 2018-07-06 PROCEDURE — 77067 SCR MAMMO BI INCL CAD: CPT

## 2018-07-09 ENCOUNTER — OFFICE VISIT (OUTPATIENT)
Dept: FAMILY MEDICINE CLINIC | Age: 43
End: 2018-07-09
Payer: COMMERCIAL

## 2018-07-09 VITALS
SYSTOLIC BLOOD PRESSURE: 138 MMHG | HEART RATE: 86 BPM | RESPIRATION RATE: 16 BRPM | DIASTOLIC BLOOD PRESSURE: 72 MMHG | HEIGHT: 64 IN | BODY MASS INDEX: 29.88 KG/M2 | WEIGHT: 175 LBS

## 2018-07-09 DIAGNOSIS — E66.9 OBESITY (BMI 30-39.9): ICD-10-CM

## 2018-07-09 DIAGNOSIS — I10 ESSENTIAL HYPERTENSION: Primary | ICD-10-CM

## 2018-07-09 PROCEDURE — 99213 OFFICE O/P EST LOW 20 MIN: CPT | Performed by: FAMILY MEDICINE

## 2018-07-09 RX ORDER — PHENTERMINE HYDROCHLORIDE 37.5 MG/1
37.5 TABLET ORAL
Qty: 30 TABLET | Refills: 0 | Status: SHIPPED | OUTPATIENT
Start: 2018-07-09 | End: 2018-08-08

## 2018-07-09 RX ORDER — TOPIRAMATE 100 MG/1
100 TABLET, FILM COATED ORAL DAILY
Qty: 90 TABLET | Refills: 3 | Status: SHIPPED | OUTPATIENT
Start: 2018-07-09 | End: 2020-02-20

## 2018-07-09 ASSESSMENT — PATIENT HEALTH QUESTIONNAIRE - PHQ9
1. LITTLE INTEREST OR PLEASURE IN DOING THINGS: 0
SUM OF ALL RESPONSES TO PHQ9 QUESTIONS 1 & 2: 0
2. FEELING DOWN, DEPRESSED OR HOPELESS: 0
SUM OF ALL RESPONSES TO PHQ QUESTIONS 1-9: 0

## 2018-08-06 ENCOUNTER — OFFICE VISIT (OUTPATIENT)
Dept: FAMILY MEDICINE CLINIC | Age: 43
End: 2018-08-06
Payer: COMMERCIAL

## 2018-08-06 VITALS
DIASTOLIC BLOOD PRESSURE: 77 MMHG | SYSTOLIC BLOOD PRESSURE: 110 MMHG | HEIGHT: 64 IN | BODY MASS INDEX: 29.19 KG/M2 | HEART RATE: 82 BPM | RESPIRATION RATE: 16 BRPM | WEIGHT: 171 LBS

## 2018-08-06 DIAGNOSIS — E66.9 OBESITY (BMI 30-39.9): Primary | ICD-10-CM

## 2018-08-06 PROCEDURE — 99213 OFFICE O/P EST LOW 20 MIN: CPT | Performed by: FAMILY MEDICINE

## 2018-08-06 RX ORDER — PHENTERMINE HYDROCHLORIDE 37.5 MG/1
37.5 TABLET ORAL
Qty: 30 TABLET | Refills: 0 | Status: SHIPPED | OUTPATIENT
Start: 2018-08-06 | End: 2018-08-17 | Stop reason: ALTCHOICE

## 2018-08-06 NOTE — PROGRESS NOTES
for chest pain and leg swelling. Gastrointestinal: Negative. Negative for abdominal pain and blood in stool. Endocrine: Negative for cold intolerance and polyuria. Genitourinary: Negative for dysuria and hematuria. Musculoskeletal: Negative. Skin: Negative for rash. Allergic/Immunologic: Negative. Neurological: Negative. Negative for dizziness, weakness and numbness. Hematological: Negative. Negative for adenopathy. Does not bruise/bleed easily. Psychiatric/Behavioral: Negative for sleep disturbance, dysphoric mood and  decreased concentration. The patient is not nervous/anxious. Objective:     Physical Exam:     Nursing note and vitals reviewed. /77   Pulse 82   Resp 16   Ht 5' 4\" (1.626 m)   Wt 171 lb (77.6 kg)   Breastfeeding? No   BMI 29.35 kg/m²   Constitutional: She is oriented to person, place, and time. She   appears well-developed and well-nourished. HENT:   Head: Normocephalic and atraumatic. Right Ear: External ear normal. Tympanic membrane is not erythematous. No middle ear effusion. Left Ear: External ear normal. Tympanic membrane is not erythematous. No middle ear effusion. Nose: No mucosal edema. Mouth/Throat: Oropharynx is clear and moist. No posterior oropharyngeal erythema. Eyes: Conjunctivae and EOM are normal. Pupils are equal, round, and reactive to light. Neck: Normal range of motion. Neck supple. No thyromegaly present. Cardiovascular: Normal rate, regular rhythm and normal heart sounds. No murmur heard. Pulmonary/Chest: Effort normal and breath sounds normal. She has no wheezes. Shehas no rales. Abdominal: Soft. Bowel sounds are normal. She exhibits no distension and no mass. There is no tenderness. There is no rebound and no guarding. Genitourinary/Anorectal:deferred  Musculoskeletal: Normal range of motion. She exhibits no edema or tenderness. Lymphadenopathy: She has no cervical adenopathy.    Neurological: She is alert and oriented to person, place, and time. She has normal reflexes. Skin: Skin is warm and dry. No rash noted. Psychiatric: She has a normal mood and affect. Her   behavior is normal.       Assessment:      1. Obesity (BMI 30-39. 9)          Plan:      Call or return to clinic prn if these symptoms worsen or fail to improve as anticipated. I have reviewed the instructions with the patient, answering all questions to her satisfaction. No Follow-up on file. No orders of the defined types were placed in this encounter. Orders Placed This Encounter   Medications    phentermine (ADIPEX-P) 37.5 MG tablet     Sig: Take 1 tablet by mouth every morning (before breakfast) for 30 days. .     Dispense:  30 tablet     Refill:  0    continue to diet and exercise    Electronically signed by Roosevelt Watts DO on 8/6/2018 at 1:33 PM

## 2018-08-17 ENCOUNTER — OFFICE VISIT (OUTPATIENT)
Dept: BARIATRICS/WEIGHT MGMT | Age: 43
End: 2018-08-17
Payer: COMMERCIAL

## 2018-08-17 VITALS
HEIGHT: 65 IN | DIASTOLIC BLOOD PRESSURE: 80 MMHG | HEART RATE: 68 BPM | WEIGHT: 170 LBS | BODY MASS INDEX: 28.32 KG/M2 | SYSTOLIC BLOOD PRESSURE: 130 MMHG

## 2018-08-17 DIAGNOSIS — E51.9 THIAMINE DEFICIENCY: Primary | ICD-10-CM

## 2018-08-17 DIAGNOSIS — Z98.84 STATUS POST BARIATRIC SURGERY: ICD-10-CM

## 2018-08-17 PROCEDURE — 99213 OFFICE O/P EST LOW 20 MIN: CPT | Performed by: SURGERY

## 2018-08-17 RX ORDER — M-VIT,TX,IRON,MINS/CALC/FOLIC 27MG-0.4MG
1 TABLET ORAL DAILY
COMMUNITY
End: 2020-02-20

## 2018-08-19 NOTE — PROGRESS NOTES
Medical Nutrition Therapy  Metabolic and Bariatric surgery  18 months  follow up note         Pt reports:         Vitals:   Vitals:    08/17/18 1300   BP: 130/80   Pulse: 68   Weight: 170 lb (77.1 kg)   Height: 5' 5\" (1.651 m)      Body mass index is 28.29 kg/m². Labs reviewed:     Multivitamin/mineral intake:2 MVI daily pt does not know name  Calcium intake:  none  Other:            Nutrition Assessment:   PES: Inadequate food and beverage intake r/t WLS as evidenced by loss of excess body weight lost 20 lbs over 3 months. Goals   60-80gm of protein  48-64oz of fluid  Vitamin adherance  Basic adherance to WLS behavious and this document has been scanned into the chart.        [] met     [x]  Not met        Plan: add 2 calcium daily   F/u annually         [facility]
Wheeze [] Cough  [] Calf Pain   Gastro-Intestinal Negative for: [x] Heartburn   [x] Reflux   [x] Dysphagia   [x] Melena   [x] BRBPR  [x] Vomiting   [x] Abdominal Pain   [x] Diarrhea  [] Hernia    [x] Constipation  [] Other:     Positive for: [] Heartburn   [] Reflux   [] Dysphagia   [] Melena   [] BRBPR  [] Vomiting   [] Abdominal Pain   [] Diarrhea  [] Hernia    [] Constipation  [] Other:    Muskuloskeletal Negative for: [] Joint pain   [] Back pain   [] Knee Pain   [x] Muscle weakness   [x] Edema [] Other:     Positive for: [] Joint pain   [] Back pain   [] Knee Pain   [] Muscle weakness  [] Edema [] Other:    Neurologic Negative for: [x] Syncope   [x] Insomnia   [] Being treated for depression  [] Other:     Positive for: [] Syncope   [] Insomnia   [] Being treated for depression  [] Other:    Skin Negative for: [x] Wound:   [] Open   [] Draining   [] Incisional     [x] Rash   [] Hair Loss  [] Other:     Positive for: [] Wound:   [] Open   [] Draining    [] Incisional  [] Rash   [] Hair Loss  [] Other:      Physical Exam:  /80   Pulse 68   Ht 5' 5\" (1.651 m)   Wt 170 lb (77.1 kg)   BMI 28.29 kg/m²   Constitutional:  Vital signs are normal. The patient appears well-developed and well-nourished. HEENT:   Head: Normocephalic. Atraumatic  Eyes: pupils are equal and reactive. No scleral icterus is present. Neck: No mass and no thyromegaly present. Cardiovascular: Normal rate, regular rhythm, S1 normal and S2 normal.  Radial pulses present   Pulmonary/Chest: Effort normal and breath sounds normal. No retractions  Abdominal: Soft. Normal appearance. There is no organomegaly. No tenderness. There is no rigidity, no rebound, no guarding and no Landaverde's sign. Musculoskeletal:        Right lower leg: Normal. No tenderness and no edema. Left lower leg: Normal. No tenderness and no edema. Neurological: The patient is alert and oriented.  Moving all 4 extremities, sensation grossly intact

## 2018-11-16 ENCOUNTER — OFFICE VISIT (OUTPATIENT)
Dept: BARIATRICS/WEIGHT MGMT | Age: 43
End: 2018-11-16
Payer: COMMERCIAL

## 2018-11-16 VITALS
RESPIRATION RATE: 20 BRPM | BODY MASS INDEX: 30.32 KG/M2 | WEIGHT: 182 LBS | SYSTOLIC BLOOD PRESSURE: 130 MMHG | HEIGHT: 65 IN | HEART RATE: 78 BPM | DIASTOLIC BLOOD PRESSURE: 70 MMHG

## 2018-11-16 DIAGNOSIS — Z98.84 STATUS POST BARIATRIC SURGERY: ICD-10-CM

## 2018-11-16 DIAGNOSIS — E51.9 THIAMINE DEFICIENCY: Primary | ICD-10-CM

## 2018-11-16 PROCEDURE — 99213 OFFICE O/P EST LOW 20 MIN: CPT | Performed by: SURGERY

## 2019-02-20 ENCOUNTER — OFFICE VISIT (OUTPATIENT)
Dept: FAMILY MEDICINE CLINIC | Age: 44
End: 2019-02-20
Payer: COMMERCIAL

## 2019-02-20 VITALS
TEMPERATURE: 97.7 F | WEIGHT: 198 LBS | RESPIRATION RATE: 16 BRPM | BODY MASS INDEX: 32.99 KG/M2 | DIASTOLIC BLOOD PRESSURE: 82 MMHG | HEART RATE: 86 BPM | SYSTOLIC BLOOD PRESSURE: 136 MMHG | HEIGHT: 65 IN

## 2019-02-20 DIAGNOSIS — J06.9 ACUTE URI: Primary | ICD-10-CM

## 2019-02-20 DIAGNOSIS — E66.9 OBESITY (BMI 30-39.9): ICD-10-CM

## 2019-02-20 LAB
INFLUENZA A ANTIBODY: NORMAL
INFLUENZA B ANTIBODY: NORMAL

## 2019-02-20 PROCEDURE — 99213 OFFICE O/P EST LOW 20 MIN: CPT | Performed by: FAMILY MEDICINE

## 2019-02-20 PROCEDURE — 87804 INFLUENZA ASSAY W/OPTIC: CPT | Performed by: FAMILY MEDICINE

## 2019-02-20 RX ORDER — GUAIFENESIN 600 MG/1
600 TABLET, EXTENDED RELEASE ORAL 2 TIMES DAILY
Qty: 120 TABLET | Refills: 5 | Status: SHIPPED | OUTPATIENT
Start: 2019-02-20 | End: 2019-04-19

## 2019-02-20 RX ORDER — PHENTERMINE HYDROCHLORIDE 37.5 MG/1
37.5 TABLET ORAL
Qty: 30 TABLET | Refills: 0 | Status: SHIPPED | OUTPATIENT
Start: 2019-02-20 | End: 2019-03-22 | Stop reason: SDUPTHER

## 2019-02-20 RX ORDER — CEFUROXIME AXETIL 500 MG/1
500 TABLET ORAL 2 TIMES DAILY
Qty: 20 TABLET | Refills: 0 | Status: SHIPPED | OUTPATIENT
Start: 2019-02-20 | End: 2019-03-02

## 2019-02-20 RX ORDER — TOPIRAMATE 25 MG/1
TABLET ORAL
Qty: 80 TABLET | Refills: 0 | Status: SHIPPED | OUTPATIENT
Start: 2019-02-20 | End: 2020-02-20

## 2019-02-20 ASSESSMENT — PATIENT HEALTH QUESTIONNAIRE - PHQ9
SUM OF ALL RESPONSES TO PHQ QUESTIONS 1-9: 0
1. LITTLE INTEREST OR PLEASURE IN DOING THINGS: 0
2. FEELING DOWN, DEPRESSED OR HOPELESS: 0
SUM OF ALL RESPONSES TO PHQ QUESTIONS 1-9: 0
SUM OF ALL RESPONSES TO PHQ9 QUESTIONS 1 & 2: 0

## 2019-03-22 ENCOUNTER — OFFICE VISIT (OUTPATIENT)
Dept: FAMILY MEDICINE CLINIC | Age: 44
End: 2019-03-22
Payer: COMMERCIAL

## 2019-03-22 VITALS
BODY MASS INDEX: 30.82 KG/M2 | HEIGHT: 65 IN | RESPIRATION RATE: 16 BRPM | DIASTOLIC BLOOD PRESSURE: 96 MMHG | SYSTOLIC BLOOD PRESSURE: 156 MMHG | HEART RATE: 74 BPM | WEIGHT: 185 LBS

## 2019-03-22 DIAGNOSIS — E66.9 OBESITY (BMI 30-39.9): ICD-10-CM

## 2019-03-22 PROCEDURE — 99213 OFFICE O/P EST LOW 20 MIN: CPT | Performed by: FAMILY MEDICINE

## 2019-03-22 RX ORDER — PHENTERMINE HYDROCHLORIDE 37.5 MG/1
37.5 TABLET ORAL
Qty: 30 TABLET | Refills: 0 | Status: SHIPPED | OUTPATIENT
Start: 2019-03-22 | End: 2019-12-23 | Stop reason: SDUPTHER

## 2019-04-19 ENCOUNTER — OFFICE VISIT (OUTPATIENT)
Dept: BARIATRICS/WEIGHT MGMT | Age: 44
End: 2019-04-19
Payer: COMMERCIAL

## 2019-04-19 VITALS
BODY MASS INDEX: 31.49 KG/M2 | WEIGHT: 189 LBS | HEART RATE: 68 BPM | HEIGHT: 65 IN | SYSTOLIC BLOOD PRESSURE: 100 MMHG | DIASTOLIC BLOOD PRESSURE: 62 MMHG

## 2019-04-19 DIAGNOSIS — I10 ESSENTIAL HYPERTENSION: Primary | ICD-10-CM

## 2019-04-19 DIAGNOSIS — Z98.84 STATUS POST BARIATRIC SURGERY: ICD-10-CM

## 2019-04-19 PROCEDURE — 99213 OFFICE O/P EST LOW 20 MIN: CPT | Performed by: SURGERY

## 2019-04-19 NOTE — PROGRESS NOTES
Medical Nutrition Therapy  Metabolic and Bariatric surgery  3 year post op        Pt reports: That she did not follow up with counseling because she thought someone was supposed to call her        Vitals:   Vitals:    04/19/19 1253   BP: 100/62   Pulse: 68   Weight: 189 lb (85.7 kg)   Height: 5' 5\" (1.651 m)      Body mass index is 31.45 kg/m². Labs reviewed:     Multivitamin/mineral intake:one MVI daily  Calcium intake: Other:            Nutrition Assessment:   PES: Excess food and beverage intake as evidenced by  excess body weight gained 7 lbs over 4 months. Goals   60-80gm of protein  48-64oz of fluid  Vitamin adherance  Basic adherance to WLS behavious and this document has been scanned into the chart.        [x] met     []  Not met        Plan:   F/u annually         Mary Gillespie

## 2019-04-21 NOTE — PROGRESS NOTES
[facility]  [facility]  [address]  [address]  [address] [place]  [address]  Dept: [phone]    SURGICAL WEIGHT LOSS MANAGEMENT PROGRAM  PROGRESS NOTE     CC: Weight Loss    Patient: Paola Mathur      Service Date: 4/19/2019  Visit:   50 month  Medical Record #: [id number]  Date of Surgery:4/18/16       Reason for Visit: Routine Post-Operative:  [] New Problem /   [x] FU of existing problem  Patient seen and examined for supervised diet and exercise. Doing well. Tolerating diet, having BM's. No fevers/chills. States she is back on diet and trying to exercise. Doing better with diet. Still frustrated with her weight loss. Back on Adipex with Dr. Angelica Giron    Height: 5' 5\" (1.651 m)  Highest PWeight: 227   lbs    Current Visit Weight Information  Weight: 189 lb (85.7 kg)   BMI: Body mass index is 31.45 kg/m². Weight loss since surgery:  38 lbs  1 wk - D/C'd home on VTE Prohylaxis? [x] Yes   [] No   On VTE Proph as directed? [x] complete, repeat doppler, no clot     [Labs to be drawn prior to 1m, 3m, 6m, 12m, 18m, and annual visits]    Liver pathology:    [] NA    [] No Gross path    [x] Liver Steatosis   [x] Discussed w/ pt   [] Referred to GI    Exercising?    [] Yes    [x] No      Comorbidities: Type 2 Diabetes Mellitus, Hypertension and Obstructive Sleep Apnea    Review of Systems:     General  Negative for: [] Weight Change   [] Fatigue   [x] Fevers & Chills    [] Appetite change [] Other:    Positive for: [x] Weight Change   [] Fatigue   [] Fevers & Chills    [] Appetite change [] Other:   Cardiac  Negative for: [x] Chest Pain   [x] Difficulty Breathing   [] Leg Cramps [x] Edema of Lower Extremeties    [] Left   [] Right      Positive for: [] Chest Pain   [] Difficulty Breathing   [] Leg Cramps [] Edema of Lower Extremeties    [] Left   [] Right   Pulmonary  Negative for: [x] Shortness of Breath [] Wheeze [x] Cough  [] Calf Pain     Positive for: [] Shortness of Breath [] Wheeze [] Cough  [] Calf Pain   Gastro-Intestinal Negative for: [x] Heartburn   [x] Reflux   [x] Dysphagia   [] Melena   [] BRBPR  [x] Vomiting   [x] Abdominal Pain   [] Diarrhea  [] Hernia    [] Constipation  [] Other:     Positive for: [] Heartburn   [] Reflux   [] Dysphagia   [] Melena   [] BRBPR  [] Vomiting   [] Abdominal Pain   [] Diarrhea  [] Hernia    [] Constipation  [] Other:    Muskuloskeletal Negative for: [] Joint pain   [x] Back pain   [x] Knee Pain   [x] Muscle weakness   [x] Edema [] Other:     Positive for: [] Joint pain   [] Back pain   [] Knee Pain   [] Muscle weakness  [] Edema [] Other:    Neurologic Negative for: [x] Syncope   [] Insomnia   [x] Being treated for depression  [] Other:     Positive for: [] Syncope   [] Insomnia   [] Being treated for depression  [] Other:    Skin Negative for: [] Wound:   [] Open   [] Draining   [] Incisional     [x] Rash   [x] Hair Loss  [] Other:     Positive for: [] Wound:   [] Open   [] Draining    [] Incisional  [] Rash   [] Hair Loss  [] Other:        Physical Exam:  /62   Pulse 68   Ht 5' 5\" (1.651 m)   Wt 189 lb (85.7 kg)   BMI 31.45 kg/m²   Constitutional:  Vital signs are normal. The patient appears well-developed and well-nourished. HEENT:   Head: Normocephalic. Atraumatic  Eyes: pupils are equal and reactive. No scleral icterus is present. Neck: No mass and no thyromegaly present. Cardiovascular: Normal rate, regular rhythm, S1 normal and S2 normal.  Radial pulses present   Pulmonary/Chest: Effort normal and breath sounds normal. No retractions  Abdominal: Soft. Normal appearance. There is no organomegaly. No tenderness. There is no rigidity, no rebound, no guarding and no Landaverde's sign. Musculoskeletal:        Right lower leg: Normal. No tenderness and no edema. Left lower leg: Normal. No tenderness and no edema. Neurological: The patient is alert and oriented.  Moving all 4 extremities, sensation grossly intact bilateral  Skin: Skin is warm, dry and intact. Psychiatric: The patient has a normal mood and affect. Speech is normal and behavior is normal. Judgment and thought content normal. Cognition and memory are normal.       Assessment & Plan:      1. Essential hypertension    2. Status post bariatric surgery            [] Actigal: [] NA   [x] S/p Cholecystectomy  [] Continue 3 months post-op  [x] Finished    [x] Advance Diet    [x] Daily protein (65-75gm/day)   [x] Take Vitamins   [x] Attend Support Group    [x] Exercise Regularly     [x] Use contraception:    [] NA    [] s/p Hysterectomy   [] s/p Tubal ligation   [] Other:     Dietician visit today    Discussed bariatric diet with patient    Discussed exercise with patient. Labs ordered    Adipex started by Dr. Niecy Vidal    She is not a candidate for revision at this time    Over 15 minutes was spent discussing and counseling for diet and exercise    Follow up: Return in about 1 year (around 4/19/2020) for Post-Op. Orders placed this encounter:   Orders Placed This Encounter   Procedures    Zinc    Ferritin    TSH without Reflex    Comprehensive Metabolic Panel    T4, Free    Hemoglobin A1C    Vitamin B12 & Folate    Vitamin B1    Magnesium    Vitamin D 25 Hydroxy    Iron and TIBC    Vitamin A    Lipid Panel    PTH, Intact    CBC Auto Differential       New Prescriptions:   No orders of the defined types were placed in this encounter. Electronically signed by Ulises Abbasi DO on 4/21/2019 at 3:28 PM    Please note that this chart was generated using voice recognition Dragon dictation software. Although every effort was made to ensure the accuracy of this automated transcription, some errors in transcription may have occurred.

## 2019-11-22 ENCOUNTER — NURSE TRIAGE (OUTPATIENT)
Dept: OTHER | Facility: CLINIC | Age: 44
End: 2019-11-22

## 2019-11-25 ENCOUNTER — OFFICE VISIT (OUTPATIENT)
Dept: FAMILY MEDICINE CLINIC | Age: 44
End: 2019-11-25
Payer: COMMERCIAL

## 2019-11-25 VITALS
OXYGEN SATURATION: 99 % | BODY MASS INDEX: 32.95 KG/M2 | DIASTOLIC BLOOD PRESSURE: 97 MMHG | WEIGHT: 198 LBS | SYSTOLIC BLOOD PRESSURE: 151 MMHG | HEART RATE: 77 BPM

## 2019-11-25 DIAGNOSIS — I10 ESSENTIAL HYPERTENSION: Primary | ICD-10-CM

## 2019-11-25 PROCEDURE — 99213 OFFICE O/P EST LOW 20 MIN: CPT | Performed by: FAMILY MEDICINE

## 2019-11-25 RX ORDER — IBUPROFEN 600 MG/1
TABLET ORAL
COMMUNITY
Start: 2019-11-19 | End: 2020-02-20

## 2019-11-25 RX ORDER — AMOXICILLIN 500 MG/1
500 CAPSULE ORAL 3 TIMES DAILY
COMMUNITY
End: 2020-02-20

## 2019-11-25 RX ORDER — AMLODIPINE BESYLATE 5 MG/1
5 TABLET ORAL
COMMUNITY
Start: 2019-11-24 | End: 2019-12-23

## 2019-11-25 ASSESSMENT — PATIENT HEALTH QUESTIONNAIRE - PHQ9
1. LITTLE INTEREST OR PLEASURE IN DOING THINGS: 0
SUM OF ALL RESPONSES TO PHQ QUESTIONS 1-9: 0
SUM OF ALL RESPONSES TO PHQ QUESTIONS 1-9: 0
SUM OF ALL RESPONSES TO PHQ9 QUESTIONS 1 & 2: 0
2. FEELING DOWN, DEPRESSED OR HOPELESS: 0

## 2019-12-23 ENCOUNTER — OFFICE VISIT (OUTPATIENT)
Dept: FAMILY MEDICINE CLINIC | Age: 44
End: 2019-12-23
Payer: COMMERCIAL

## 2019-12-23 VITALS
HEART RATE: 74 BPM | WEIGHT: 206 LBS | OXYGEN SATURATION: 100 % | RESPIRATION RATE: 16 BRPM | SYSTOLIC BLOOD PRESSURE: 147 MMHG | DIASTOLIC BLOOD PRESSURE: 97 MMHG | HEIGHT: 64 IN | BODY MASS INDEX: 35.17 KG/M2

## 2019-12-23 DIAGNOSIS — E66.9 OBESITY (BMI 30-39.9): ICD-10-CM

## 2019-12-23 DIAGNOSIS — I10 ESSENTIAL HYPERTENSION: Primary | ICD-10-CM

## 2019-12-23 PROCEDURE — 99213 OFFICE O/P EST LOW 20 MIN: CPT | Performed by: FAMILY MEDICINE

## 2019-12-23 RX ORDER — METOPROLOL SUCCINATE 50 MG/1
50 TABLET, EXTENDED RELEASE ORAL DAILY
Qty: 30 TABLET | Refills: 3 | Status: SHIPPED | OUTPATIENT
Start: 2019-12-23 | End: 2020-06-04 | Stop reason: SDUPTHER

## 2019-12-23 RX ORDER — PHENTERMINE HYDROCHLORIDE 37.5 MG/1
37.5 TABLET ORAL
Qty: 30 TABLET | Refills: 0 | Status: SHIPPED | OUTPATIENT
Start: 2019-12-23 | End: 2020-01-20 | Stop reason: SDUPTHER

## 2020-01-20 ENCOUNTER — OFFICE VISIT (OUTPATIENT)
Dept: FAMILY MEDICINE CLINIC | Age: 45
End: 2020-01-20
Payer: COMMERCIAL

## 2020-01-20 VITALS
BODY MASS INDEX: 33.22 KG/M2 | SYSTOLIC BLOOD PRESSURE: 134 MMHG | WEIGHT: 194.6 LBS | DIASTOLIC BLOOD PRESSURE: 82 MMHG | HEART RATE: 70 BPM | HEIGHT: 64 IN | OXYGEN SATURATION: 100 %

## 2020-01-20 PROCEDURE — 99213 OFFICE O/P EST LOW 20 MIN: CPT | Performed by: FAMILY MEDICINE

## 2020-01-20 RX ORDER — PHENTERMINE HYDROCHLORIDE 37.5 MG/1
37.5 TABLET ORAL
Qty: 30 TABLET | Refills: 0 | Status: SHIPPED | OUTPATIENT
Start: 2020-01-20 | End: 2020-10-29 | Stop reason: SDUPTHER

## 2020-01-20 ASSESSMENT — PATIENT HEALTH QUESTIONNAIRE - PHQ9
SUM OF ALL RESPONSES TO PHQ9 QUESTIONS 1 & 2: 0
2. FEELING DOWN, DEPRESSED OR HOPELESS: 0
SUM OF ALL RESPONSES TO PHQ QUESTIONS 1-9: 0
SUM OF ALL RESPONSES TO PHQ QUESTIONS 1-9: 0
1. LITTLE INTEREST OR PLEASURE IN DOING THINGS: 0

## 2020-02-20 ENCOUNTER — OFFICE VISIT (OUTPATIENT)
Dept: FAMILY MEDICINE CLINIC | Age: 45
End: 2020-02-20
Payer: COMMERCIAL

## 2020-02-20 VITALS
SYSTOLIC BLOOD PRESSURE: 138 MMHG | DIASTOLIC BLOOD PRESSURE: 89 MMHG | HEART RATE: 68 BPM | OXYGEN SATURATION: 100 % | WEIGHT: 196.2 LBS | TEMPERATURE: 97.7 F | BODY MASS INDEX: 33.68 KG/M2

## 2020-02-20 PROCEDURE — 99213 OFFICE O/P EST LOW 20 MIN: CPT | Performed by: FAMILY MEDICINE

## 2020-02-20 RX ORDER — PHENTERMINE HYDROCHLORIDE 37.5 MG/1
37.5 TABLET ORAL
Qty: 30 TABLET | Refills: 0 | Status: SHIPPED | OUTPATIENT
Start: 2020-02-20 | End: 2020-03-21

## 2020-02-20 RX ORDER — PHENTERMINE HYDROCHLORIDE 37.5 MG/1
37.5 CAPSULE ORAL EVERY MORNING
COMMUNITY
End: 2020-02-20 | Stop reason: SDUPTHER

## 2020-02-20 NOTE — PATIENT INSTRUCTIONS
[facility] [employer] for [employer] and [employer]    6 think fresh  Most of the sodium Americans eat is found in processed  foods. Eat highly processed foods less often and in  smaller portions--especially cheesy foods, such as pizza;  cured meats, such as smith, sausage, hot dogs, and deli/  luncheon meats; and ready-to-eat foods, like canned chili,  ravioli, and soups. Fresh foods are generally lower in sodium. enjoy home-prepared foods  Shon Jose more often at [facility] you are in control of  whats in your food. Preparing your own foods allows  you to limit the amount of salt in them. fill up on veggies and fruits--they are  naturally low in sodium  Eat plenty of vegetables and fruits--fresh or frozen. Eat a vegetable or fruit at every meal.  choose dairy and protein foods that  are lower in sodium  Choose more fat-free or low-fat milk and yogurt in  place of cheese, which is higher in sodium. Choose fresh  beef, pork, poultry, and seafood, rather than those with salt  added. Deli or luncheon meats, sausages, and canned products  like corned beef are higher in sodium. Choose unsalted nuts  and seeds. adjust your taste buds  Cut back on salt little by little--and pay  attention to the natural tastes of various  foods. Your taste for salt will lessen over time. salt and sodium    10 tips to help you cut back  Its clear that Americans have a taste for salt, but salt plays a role in high blood pressure. Everyone,  including kids, should reduce their sodium intake to less than 2,300 milligrams of sodium a day (about 1 teaspoon  of salt). Adults age 46 and older,  Americans of any age, and individuals with high blood pressure, diabetes,  or chronic kidney disease should further reduce their sodium intake to 1,500 mg a day. skip the salt  Skip adding salt when cooking. Keep salt off the  kitchen counter and the dinner table.  Use spices,  herbs, garlic, vinegar, or lemon

## 2020-02-20 NOTE — PROGRESS NOTES
XL) 50 MG extended release tablet Take 1 tablet by mouth daily 30 tablet 3     No current facility-administered medications for this visit. ALLERGIES:  No Known Allergies    Social History     Tobacco Use    Smoking status: Former Smoker     Packs/day: 1.00     Years: 5.00     Pack years: 5.00     Last attempt to quit: 2014     Years since quittin.8    Smokeless tobacco: Never Used   Substance Use Topics    Alcohol use: No      Body mass index is 33.68 kg/m². /89   Pulse 68   Temp 97.7 °F (36.5 °C) (Oral)   Wt 196 lb 3.2 oz (89 kg)   SpO2 100%   BMI 33.68 kg/m²     Subjective:      HPI    44 yo female coming today for follow-up of her blood pressure also weight. Patient tells me that she started a keto diet and she is able to eat everything besides carbs so when she picks her to food she only looks at the food table how much carbs is added but she does not look at the sodium content or refined sugar. The patient actually eats a lot of pepperoni which has a lot of sodium just 15 slices which she looked up online which was here at the office. The patient actually lost at her home scale 2 pounds. Here at the office she gained 2 pounds. Diet: low carb. Exercises: Patient tries to walk once twice a week. SE: Denies any side effects. Review of Systems   Constitutional: Negative for fever and unexpected weight change. Respiratory: Negative for cough and shortness of breath. Cardiovascular: Negative for chest pain and leg swelling. Gastrointestinal: Negative for diarrhea, constipation and blood in stool. Musculoskeletal: Negative for back pain and gait problem. Skin: Negative for color change and rash. Objective:   Physical Exam  Constitutional: VS (see above). General appearance: normal development, habitus and attention, no deformities. No distress. Eyes: normal conjunctiva and lids. CAV: RRR, no RMG. No edema lower extremities.   Pulmo: CTA bilateral, no CWR.  Skin: no rashes, lesions or ulcers. Musculoskeletal: normal gait. Nails: no clubbing or cyanosis. Psychiatric: alert and oriented to place, time and person. Normal mood and affect. Assessment:       Diagnosis Orders   1. Essential hypertension     2. Obesity (BMI 30-39.9)  phentermine (ADIPEX-P) 37.5 MG tablet       Plan:   Blood pressure well controlled but I discussed with patient to check the sodium to check food labels. Last prescription of Adipex provided. Call or return to clinic prn if these symptoms worsen or fail to improve as anticipated. I have reviewed the instructions with the patient, answering all questions to her satisfaction. Return in about 7 months (around 9/20/2020), or if symptoms worsen or fail to improve, for weight, HTN. No orders of the defined types were placed in this encounter. Orders Placed This Encounter   Medications    phentermine (ADIPEX-P) 37.5 MG tablet     Sig: Take 1 tablet by mouth every morning (before breakfast) for 30 days. Dispense:  30 tablet     Refill:  0       Noriea received counseling on the following healthy behaviors: nutrition, exercise and medication adherence  Reviewed prior labs and health maintenance  Continue current medications, diet and exercise. Discussed use, benefit, and side effects of prescribed medications. Barriers to medication compliance addressed. Patient given educational materials - see patient instructions  Was a self-tracking handout given in paper form or via [email or web address]? No: .    Requested Prescriptions     Signed Prescriptions Disp Refills    phentermine (ADIPEX-P) 37.5 MG tablet 30 tablet 0     Sig: Take 1 tablet by mouth every morning (before breakfast) for 30 days. All patient questions answered. Patient voiced understanding. Quality Measures    Body mass index is 33.68 kg/m². Elevated.  Weight control planned discussed medically supervised diet with primary care physician, daily exercise regimen and

## 2020-03-09 ENCOUNTER — TELEPHONE (OUTPATIENT)
Dept: BARIATRICS/WEIGHT MGMT | Age: 45
End: 2020-03-09

## 2020-06-04 RX ORDER — METOPROLOL SUCCINATE 50 MG/1
50 TABLET, EXTENDED RELEASE ORAL DAILY
Qty: 30 TABLET | Refills: 3 | Status: SHIPPED | OUTPATIENT
Start: 2020-06-04 | End: 2020-10-29 | Stop reason: SDUPTHER

## 2020-08-04 ENCOUNTER — TELEMEDICINE (OUTPATIENT)
Dept: FAMILY MEDICINE CLINIC | Age: 45
End: 2020-08-04

## 2020-08-04 PROCEDURE — 99213 OFFICE O/P EST LOW 20 MIN: CPT | Performed by: FAMILY MEDICINE

## 2020-08-04 RX ORDER — FLUCONAZOLE 150 MG/1
150 TABLET ORAL ONCE
Qty: 2 TABLET | Refills: 0 | Status: SHIPPED | OUTPATIENT
Start: 2020-08-04 | End: 2020-08-04

## 2020-08-04 ASSESSMENT — PATIENT HEALTH QUESTIONNAIRE - PHQ9
SUM OF ALL RESPONSES TO PHQ9 QUESTIONS 1 & 2: 0
SUM OF ALL RESPONSES TO PHQ QUESTIONS 1-9: 0
SUM OF ALL RESPONSES TO PHQ QUESTIONS 1-9: 0
2. FEELING DOWN, DEPRESSED OR HOPELESS: 0
1. LITTLE INTEREST OR PLEASURE IN DOING THINGS: 0

## 2020-08-04 NOTE — PROGRESS NOTES
2020    TELEHEALTH EVALUATION -- Audio/Visual (During RBEKQ-44 public health emergency)    HPI:    Nela Ramirez (:  1975) has requested an audio/video evaluation for the following concern(s):    She is being seen in virtual visit evaluation today for follow-up on hypertension she states her numbers are doing well at home  She thinks she has a yeast infection and some medication to help treat that she is having discharge and itching  And she also would like to go back on diet medication she has been having a rough time with her weight lately especially because she got custody of her grandchildren    Review of Systems    Prior to Visit Medications    Medication Sig Taking? Authorizing Provider   fluconazole (DIFLUCAN) 150 MG tablet Take 1 tablet by mouth once for 1 dose May repeat in 3-5 days, if symptoms persist or recur.  Yes Marisol Tinsley,    metoprolol succinate (TOPROL XL) 50 MG extended release tablet Take 1 tablet by mouth daily Yes Marisol Tinsley, DO       Social History     Tobacco Use    Smoking status: Former Smoker     Packs/day: 1.00     Years: 5.00     Pack years: 5.00     Last attempt to quit: 2014     Years since quittin.3    Smokeless tobacco: Never Used   Substance Use Topics    Alcohol use: No    Drug use: No        No Known Allergies,   Past Medical History:   Diagnosis Date    Arthritis     right knee    Congestion of nasal sinus     Diabetes mellitus (HCC)     GERD (gastroesophageal reflux disease)     Hyperlipidemia     Hypertension     Obesity     Sinus congestion     Sleep apnea     C pap machine    Status post laparoscopic sleeve gastrectomy 16    Dr. Osmar Burt, hosp wt = 224lb, initial wt = 219lb   ,   Past Surgical History:   Procedure Laterality Date    BREAST SURGERY Bilateral     breast reduction    KNEE SURGERY Left     torn ligaments    SLEEVE GASTRECTOMY  16    with EGD, liver Bx, lap marco a   ,   Social History Tobacco Use    Smoking status: Former Smoker     Packs/day: 1.00     Years: 5.00     Pack years: 5.00     Last attempt to quit: 2014     Years since quittin.3    Smokeless tobacco: Never Used   Substance Use Topics    Alcohol use: No    Drug use: No   ,   Family History   Problem Relation Age of Onset    High Cholesterol Mother     Arthritis Mother     High Blood Pressure Mother     Substance Abuse Father     Heart Disease Sister     Diabetes Maternal Aunt     Substance Abuse Maternal Uncle     Arthritis Maternal Grandmother     Depression Maternal Grandmother     Diabetes Maternal Grandmother     High Blood Pressure Maternal Grandmother     Mental Illness Maternal Grandmother     Stroke Maternal Grandmother     Heart Disease Maternal Grandfather     Cancer Paternal Grandmother     Diabetes Paternal Grandmother     Diabetes Maternal Cousin        PHYSICAL EXAMINATION:  [ INSTRUCTIONS:  \"[x]\" Indicates a positive item  \"[]\" Indicates a negative item  -- DELETE ALL ITEMS NOT EXAMINED]  Vital Signs: (As obtained by patient/caregiver or practitioner observation)    Blood pressure-  Heart rate-    Respiratory rate-    Temperature-  Pulse oximetry-     Constitutional: [x] Appears well-developed and well-nourished [x] No apparent distress      [] Abnormal-   Mental status  [x] Alert and awake  [x] Oriented to person/place/time []Able to follow commands      Eyes:  EOM    [x]  Normal  [] Abnormal-  Sclera  [x]  Normal  [] Abnormal -         Discharge [x]  None visible  [] Abnormal -    HENT:   [x] Normocephalic, atraumatic.   [] Abnormal   [x] Mouth/Throat: Mucous membranes are moist.     External Ears [x] Normal  [] Abnormal-     Neck: [x] No visualized mass     Pulmonary/Chest: [x] Respiratory effort normal.  [] No visualized signs of difficulty breathing or respiratory distress        [] Abnormal-      Musculoskeletal:   [x] Normal gait with no signs of ataxia         [x] Normal range of motion of neck        [] Abnormal-       Neurological:        [x] No Facial Asymmetry (Cranial nerve 7 motor function) (limited exam to video visit)          [x] No gaze palsy        [] Abnormal-         Skin:        [x] No significant exanthematous lesions or discoloration noted on facial skin         [] Abnormal-            Psychiatric:       [x] Normal Affect [x] No Hallucinations        [] Abnormal-     Other pertinent observable physical exam findings-     ASSESSMENT/PLAN:  1. Candidiasis    2. Essential hypertension      No orders of the defined types were placed in this encounter. Requested Prescriptions     Signed Prescriptions Disp Refills    fluconazole (DIFLUCAN) 150 MG tablet 2 tablet 0     Sig: Take 1 tablet by mouth once for 1 dose May repeat in 3-5 days, if symptoms persist or recur. Return in about 2 months (around 10/4/2020) for re-check. Mynor Zhou is a 45 y.o. female being evaluated by a Virtual Visit (video visit) encounter to address concerns as mentioned above. A caregiver was present when appropriate. Due to this being a TeleHealth encounter (During JEQUE-95 public health emergency), evaluation of the following organ systems was limited: Vitals/Constitutional/EENT/Resp/CV/GI//MS/Neuro/Skin/Heme-Lymph-Imm. Pursuant to the emergency declaration under the [address] [place] [address], [address] [place] [address] authority and the [employer] Act, this Virtual Visit was conducted with patient's (and/or legal guardian's) consent, to reduce the patient's risk of exposure to COVID-19 and provide necessary medical care. The patient (and/or legal guardian) has also been advised to contact this office for worsening conditions or problems, and seek emergency medical treatment and/or call 911 if deemed necessary.      Patient identification was verified at the start of the visit: Yes    Total time spent on this encounter:

## 2020-10-07 ENCOUNTER — OFFICE VISIT (OUTPATIENT)
Dept: BARIATRICS/WEIGHT MGMT | Age: 45
End: 2020-10-07
Payer: COMMERCIAL

## 2020-10-07 VITALS
WEIGHT: 205 LBS | HEIGHT: 64 IN | SYSTOLIC BLOOD PRESSURE: 118 MMHG | DIASTOLIC BLOOD PRESSURE: 72 MMHG | BODY MASS INDEX: 35 KG/M2 | HEART RATE: 80 BPM | RESPIRATION RATE: 18 BRPM | TEMPERATURE: 96.8 F

## 2020-10-07 PROBLEM — R10.13 EPIGASTRIC PAIN: Status: ACTIVE | Noted: 2020-10-07

## 2020-10-07 PROCEDURE — 99213 OFFICE O/P EST LOW 20 MIN: CPT | Performed by: NURSE PRACTITIONER

## 2020-10-07 RX ORDER — SUCRALFATE 1 G/1
1 TABLET ORAL 4 TIMES DAILY
Qty: 120 TABLET | Refills: 3 | Status: SHIPPED | OUTPATIENT
Start: 2020-10-07 | End: 2021-07-15

## 2020-10-07 RX ORDER — PANTOPRAZOLE SODIUM 40 MG/1
40 TABLET, DELAYED RELEASE ORAL DAILY
Qty: 30 TABLET | Refills: 3 | Status: SHIPPED | OUTPATIENT
Start: 2020-10-07 | End: 2021-07-15 | Stop reason: SDUPTHER

## 2020-10-07 NOTE — PROGRESS NOTES
Post-op Bariatric Surgery Note    Subjective     Patient is 4.5 years s/p laparoscopic sleeve gastrectomy, down 19 lbs. Here for problem-focused exam.  Went off PPI, then developed epigastric pain and GERD. Denies smoking, caffeine, or ETOH use. Has been taking Ibuprofen prn and under personal emotional stress. Denies fever/chills, N/V or black tarry stools. Using OTC remedies without relief. Allergies:  No Known Allergies    Past Medical History:     Past Medical History:   Diagnosis Date    Arthritis     right knee    Congestion of nasal sinus     Diabetes mellitus ([address]     GERD (gastroesophageal reflux disease)     Hyperlipidemia     Hypertension     Obesity     Sinus congestion     Sleep apnea     C pap machine    Status post laparoscopic sleeve gastrectomy 4-18-16    Dr. Raimundo Mcbride, hosp wt = 224lb, initial wt = 219lb   .     Past Surgical History:  Past Surgical History:   Procedure Laterality Date    BREAST SURGERY Bilateral 2005    breast reduction    KNEE SURGERY Left     torn ligaments    SLEEVE GASTRECTOMY  4-18-16    with EGD, liver Bx, lap marco a       Family History:  Family History   Problem Relation Age of Onset    High Cholesterol Mother     Arthritis Mother     High Blood Pressure Mother     Substance Abuse Father     Heart Disease Sister     Diabetes Maternal Aunt     Substance Abuse Maternal Uncle     Arthritis Maternal Grandmother     Depression Maternal Grandmother     Diabetes Maternal Grandmother     High Blood Pressure Maternal Grandmother     Mental Illness Maternal Grandmother     Stroke Maternal Grandmother     Heart Disease Maternal Grandfather     Cancer Paternal Grandmother     Diabetes Paternal Grandmother     Diabetes Maternal Cousin        Social History:  Social History     Socioeconomic History    Marital status: Single     Spouse name: Not on file    Number of children: Not on file    Years of education: Not on file   [facility] BMI/Height/Weight:  Body mass index is 35.19 kg/m². Review of Systems - A review of systems was performed. All was negative unless otherwise documented in HPI. Constitutional: Negative for fever, chills and diaphoresis. HENT: Negative for hearing loss and trouble swallowing. Eyes: Negative for photophobia and visual disturbance. Respiratory: Negative for cough, shortness of breath and wheezing. Cardiovascular: Negative for chest pain and palpitations. Gastrointestinal: Negative for nausea, vomiting, diarrhea, constipation, blood in stool and abdominal distention. Positive for abdominal pain. Endocrine: Negative for polydipsia, polyphagia and polyuria. Genitourinary: Negative for dysuria, frequency, hematuria and difficulty urinating. Musculoskeletal: Negative for myalgias, joint swelling. Skin: Negative for pallor and rash. Neurological: Negative for dizziness, tremors, light-headedness and headaches. Psychiatric/Behavioral: Negative for sleep disturbance and dysphoric mood. Objective:      Physical Exam   Vital signs reviewed. General: Well-developed and well-nourished. No acute distress. Skin: Warm, dry and intact. HEENT: Normocephalic. EOMs intact. Conjunctivae normal. Neck supple. Cardiovascular: Normal rate, regular rhythm. Pulmonary/Chest: Normal effort. Lungs clear to auscultation. No rales, rhonchi or wheezing. Abdominal: Positive bowel sounds. Soft, nontender. Nondistended. No rigidity, rebound, or guarding. Musculoskeletal: Movement x4. No edema. Neurological: Gait normal. Alert and oriented to person, place, and time. Psychiatric: Normal mood and affect. Speech and behavior normal. Judgment and thought content normal. Cognition and memory intact. Assessment:       Diagnosis Orders   1. Gastroesophageal reflux disease, unspecified whether esophagitis present  pantoprazole (PROTONIX) 40 MG tablet    sucralfate (CARAFATE) 1 GM tablet   2.  Essential hypertension     3. Dyslipidemia     4. Status post laparoscopic sleeve gastrectomy     5. Obesity (BMI 30-39. 9)         Plan:    Protonix and Carafate e-prescribed. [place] diet. Avoid triggers. Avoid NSAIDs. Stress reduction. Call if no better in 1-2 weeks. May need EGD. Call and schedule annual post-op visit. Follow-up  Return in about 6 months (around 4/7/2021). Orders this encounter:  No orders of the defined types were placed in this encounter.       Prescriptions this encounter:  Orders Placed This Encounter   Medications    pantoprazole (PROTONIX) 40 MG tablet     Sig: Take 1 tablet by mouth daily     Dispense:  30 tablet     Refill:  3    sucralfate (CARAFATE) 1 GM tablet     Sig: Take 1 tablet by mouth 4 times daily     Dispense:  120 tablet     Refill:  3       Electronically signed by:  Leann Borja CNP

## 2020-10-29 ENCOUNTER — TELEMEDICINE (OUTPATIENT)
Dept: FAMILY MEDICINE CLINIC | Age: 45
End: 2020-10-29
Payer: COMMERCIAL

## 2020-10-29 PROCEDURE — 99214 OFFICE O/P EST MOD 30 MIN: CPT | Performed by: FAMILY MEDICINE

## 2020-10-29 RX ORDER — GUAIFENESIN 600 MG/1
1200 TABLET, EXTENDED RELEASE ORAL 2 TIMES DAILY
Qty: 40 TABLET | Refills: 0 | Status: SHIPPED | OUTPATIENT
Start: 2020-10-29 | End: 2020-11-08

## 2020-10-29 RX ORDER — PHENTERMINE HYDROCHLORIDE 37.5 MG/1
37.5 TABLET ORAL
Qty: 30 TABLET | Refills: 0 | Status: SHIPPED | OUTPATIENT
Start: 2020-10-29 | End: 2020-12-01 | Stop reason: SDUPTHER

## 2020-10-29 RX ORDER — METOPROLOL SUCCINATE 50 MG/1
50 TABLET, EXTENDED RELEASE ORAL DAILY
Qty: 30 TABLET | Refills: 5 | Status: SHIPPED | OUTPATIENT
Start: 2020-10-29 | End: 2021-06-09 | Stop reason: SDUPTHER

## 2020-10-29 ASSESSMENT — PATIENT HEALTH QUESTIONNAIRE - PHQ9
2. FEELING DOWN, DEPRESSED OR HOPELESS: 0
1. LITTLE INTEREST OR PLEASURE IN DOING THINGS: 0
SUM OF ALL RESPONSES TO PHQ QUESTIONS 1-9: 0
SUM OF ALL RESPONSES TO PHQ9 QUESTIONS 1 & 2: 0
SUM OF ALL RESPONSES TO PHQ QUESTIONS 1-9: 0
SUM OF ALL RESPONSES TO PHQ QUESTIONS 1-9: 0

## 2020-10-29 NOTE — PROGRESS NOTES
10/29/2020    TELEHEALTH EVALUATION -- Audio/Visual (During IRXJY-48 public health emergency)    HPI:    Eliseo Hamm (:  1975) has requested an audio/video evaluation for the following concern(s):    She is being seen today in virtual visit evaluation. She has several complaints she states she has been having some ear pain for the last several days and it feels like it is going down her neck she denies any congestion cough fever sore throat  She is doing a blood pressure check she has checked readings at home and she states that it is doing well  She states she has knee pain she had surgery 2 years ago and recently it feels like her knee is going to give out she has gained about 40 pounds  She is due for laboratory studies and a mammogram  And she would like to start back on medication to help with the weight loss    Review of Systems    Prior to Visit Medications    Medication Sig Taking? Authorizing Provider   metoprolol succinate (TOPROL XL) 50 MG extended release tablet Take 1 tablet by mouth daily Yes Marisol Tinsley, DO   phentermine (ADIPEX-P) 37.5 MG tablet Take 1 tablet by mouth every morning (before breakfast) for 30 days.  Yes Marisol Tinsley, DO   guaiFENesin (MUCINEX) 600 MG extended release tablet Take 2 tablets by mouth 2 times daily for 10 days Yes Marisol Tinsley DO   pantoprazole (PROTONIX) 40 MG tablet Take 1 tablet by mouth daily Yes LESA Lamb CNP   sucralfate (CARAFATE) 1 GM tablet Take 1 tablet by mouth 4 times daily Yes LESA Lamb CNP       Social History     Tobacco Use    Smoking status: Former Smoker     Packs/day: 1.00     Years: 5.00     Pack years: 5.00     Last attempt to quit: 2014     Years since quittin.5    Smokeless tobacco: Never Used   Substance Use Topics    Alcohol use: No    Drug use: No        No Known Allergies,   Past Medical History:   Diagnosis Date    Arthritis     right knee    Congestion of nasal sinus     Diabetes mellitus ([address]     GERD (gastroesophageal reflux disease)     Hyperlipidemia     Hypertension     Obesity     Sinus congestion     Sleep apnea     C pap machine    Status post laparoscopic sleeve gastrectomy 16    Dr. Arnita Councilman, hosp wt = 224lb, initial wt = 219lb   ,   Past Surgical History:   Procedure Laterality Date    BREAST SURGERY Bilateral     breast reduction    KNEE SURGERY Left     torn ligaments    SLEEVE GASTRECTOMY  16    with EGD, liver Bx, lap marco a   ,   Social History     Tobacco Use    Smoking status: Former Smoker     Packs/day: 1.00     Years: 5.00     Pack years: 5.00     Last attempt to quit: 2014     Years since quittin.5    Smokeless tobacco: Never Used   Substance Use Topics    Alcohol use: No    Drug use: No   ,   Family History   Problem Relation Age of Onset    High Cholesterol Mother     Arthritis Mother     High Blood Pressure Mother     Substance Abuse Father     Heart Disease Sister     Diabetes Maternal Aunt     Substance Abuse Maternal Uncle     Arthritis Maternal Grandmother     Depression Maternal Grandmother     Diabetes Maternal Grandmother     High Blood Pressure Maternal Grandmother     Mental Illness Maternal Grandmother     Stroke Maternal Grandmother     Heart Disease Maternal Grandfather     Cancer Paternal Grandmother     Diabetes Paternal Grandmother     Diabetes Maternal Cousin        PHYSICAL EXAMINATION:  [ INSTRUCTIONS:  \"[x]\" Indicates a positive item  \"[]\" Indicates a negative item  -- DELETE ALL ITEMS NOT EXAMINED]  Vital Signs: (As obtained by patient/caregiver or practitioner observation)    Blood pressure-  Heart rate-    Respiratory rate-    Temperature-  Pulse oximetry-     Constitutional: [x] Appears well-developed and well-nourished [x] No apparent distress      [] Abnormal-   Mental status  [x] Alert and awake  [x] Oriented to person/place/time [x]Able to follow commands      Eyes:  EOM [x]  Normal  [] Abnormal-  Sclera  [x]  Normal  [] Abnormal -         Discharge [x]  None visible  [] Abnormal -    HENT:   [] Normocephalic, atraumatic. [] Abnormal   [x] Mouth/Throat: Mucous membranes are moist.     External Ears [x] Normal  [] Abnormal-     Neck: [x] No visualized mass     Pulmonary/Chest: [x] Respiratory effort normal.  [x] No visualized signs of difficulty breathing or respiratory distress        [] Abnormal-      Musculoskeletal:   [x] Normal gait with no signs of ataxia         [x] Normal range of motion of neck        [] Abnormal-       Neurological:        [x] No Facial Asymmetry (Cranial nerve 7 motor function) (limited exam to video visit)          [x] No gaze palsy        [] Abnormal-         Skin:        [x] No significant exanthematous lesions or discoloration noted on facial skin         [] Abnormal-            Psychiatric:       [x] Normal Affect [] No Hallucinations        [] Abnormal-     Other pertinent observable physical exam findings-     ASSESSMENT/PLAN:  1. Essential hypertension    2. Ear pain, bilateral    3. Obesity (BMI 30-39.9)    4. Chronic knee pain, unspecified laterality    5. Screening mammogram, encounter for      Orders Placed This Encounter   Procedures    JESUS DIGITAL SCREEN W OR WO CAD BILATERAL    CBC Auto Differential    Comprehensive Metabolic Panel    Lipid Panel    T4, Free    TSH without Reflex    Vitamin D 25 Hydroxy     Requested Prescriptions     Signed Prescriptions Disp Refills    metoprolol succinate (TOPROL XL) 50 MG extended release tablet 30 tablet 5     Sig: Take 1 tablet by mouth daily    phentermine (ADIPEX-P) 37.5 MG tablet 30 tablet 0     Sig: Take 1 tablet by mouth every morning (before breakfast) for 30 days.  guaiFENesin (MUCINEX) 600 MG extended release tablet 40 tablet 0     Sig: Take 2 tablets by mouth 2 times daily for 10 days         Return in about 4 weeks (around 11/26/2020) for re-check.     Gilford Scripture is a 39

## 2020-11-27 ENCOUNTER — TELEPHONE (OUTPATIENT)
Dept: FAMILY MEDICINE CLINIC | Age: 45
End: 2020-11-27

## 2020-12-01 ENCOUNTER — TELEMEDICINE (OUTPATIENT)
Dept: FAMILY MEDICINE CLINIC | Age: 45
End: 2020-12-01
Payer: COMMERCIAL

## 2020-12-01 PROCEDURE — 99213 OFFICE O/P EST LOW 20 MIN: CPT | Performed by: FAMILY MEDICINE

## 2020-12-01 RX ORDER — PHENTERMINE HYDROCHLORIDE 37.5 MG/1
37.5 TABLET ORAL
Qty: 30 TABLET | Refills: 0 | Status: SHIPPED | OUTPATIENT
Start: 2020-12-01 | End: 2021-01-05 | Stop reason: SDUPTHER

## 2020-12-01 ASSESSMENT — PATIENT HEALTH QUESTIONNAIRE - PHQ9
2. FEELING DOWN, DEPRESSED OR HOPELESS: 0
SUM OF ALL RESPONSES TO PHQ9 QUESTIONS 1 & 2: 0
SUM OF ALL RESPONSES TO PHQ QUESTIONS 1-9: 0
1. LITTLE INTEREST OR PLEASURE IN DOING THINGS: 0
SUM OF ALL RESPONSES TO PHQ QUESTIONS 1-9: 0
SUM OF ALL RESPONSES TO PHQ QUESTIONS 1-9: 0

## 2020-12-01 NOTE — PROGRESS NOTES
Neurological:        [x] No Facial Asymmetry (Cranial nerve 7 motor function) (limited exam to video visit)          [x] No gaze palsy        [] Abnormal-         Skin:        [x] No significant exanthematous lesions or discoloration noted on facial skin         [] Abnormal-            Psychiatric:       [x] Normal Affect [x] No Hallucinations        [] Abnormal-     Other pertinent observable physical exam findings-     ASSESSMENT/PLAN:   Diagnosis Orders   1. Essential hypertension     2. Obesity (BMI 30-39.9)  phentermine (ADIPEX-P) 37.5 MG tablet      No orders of the defined types were placed in this encounter. Requested Prescriptions     Signed Prescriptions Disp Refills    phentermine (ADIPEX-P) 37.5 MG tablet 30 tablet 0     Sig: Take 1 tablet by mouth every morning (before breakfast) for 30 days. No follow-ups on file. Pedro Jean-Baptiste is a 39 y.o. female being evaluated by a Virtual Visit (video visit) encounter to address concerns as mentioned above. A caregiver was present when appropriate. Due to this being a TeleHealth encounter (During [facility]- public health emergency), evaluation of the following organ systems was limited: Vitals/Constitutional/EENT/Resp/CV/GI//MS/Neuro/Skin/Heme-Lymph-Imm. Pursuant to the emergency declaration under the [address] [place] [address] authority and the [employer] Act, this Virtual Visit was conducted with patient's (and/or legal guardian's) consent, to reduce the patient's risk of exposure to COVID-19 and provide necessary medical care. The patient (and/or legal guardian) has also been advised to contact this office for worsening conditions or problems, and seek emergency medical treatment and/or call 911 if deemed necessary.      Patient identification was verified at the start of the visit: Yes    Total time spent on this encounter: Not billed by time    Services were provided through a video synchronous discussion virtually to substitute for in-person clinic visit. Patient and provider were located at their individual homes. --Merritt Walker DO on 12/1/2020 at 1:15 PM    An electronic signature was used to authenticate this note.

## 2021-01-05 ENCOUNTER — TELEMEDICINE (OUTPATIENT)
Dept: FAMILY MEDICINE CLINIC | Age: 46
End: 2021-01-05

## 2021-01-05 DIAGNOSIS — E66.9 OBESITY (BMI 30-39.9): ICD-10-CM

## 2021-01-05 PROCEDURE — 99213 OFFICE O/P EST LOW 20 MIN: CPT | Performed by: FAMILY MEDICINE

## 2021-01-05 RX ORDER — PHENTERMINE HYDROCHLORIDE 37.5 MG/1
37.5 TABLET ORAL
Qty: 30 TABLET | Refills: 0 | Status: SHIPPED | OUTPATIENT
Start: 2021-01-05 | End: 2021-02-04

## 2021-01-05 ASSESSMENT — PATIENT HEALTH QUESTIONNAIRE - PHQ9
SUM OF ALL RESPONSES TO PHQ QUESTIONS 1-9: 0
1. LITTLE INTEREST OR PLEASURE IN DOING THINGS: 0

## 2021-01-05 NOTE — PROGRESS NOTES
Years: 5.00     Pack years: 5.00     Quit date: 2014     Years since quittin.7    Smokeless tobacco: Never Used   Substance Use Topics    Alcohol use: No    Drug use: No   ,   Family History   Problem Relation Age of Onset    High Cholesterol Mother     Arthritis Mother     High Blood Pressure Mother     Substance Abuse Father     Heart Disease Sister     Diabetes Maternal Aunt     Substance Abuse Maternal Uncle     Arthritis Maternal Grandmother     Depression Maternal Grandmother     Diabetes Maternal Grandmother     High Blood Pressure Maternal Grandmother     Mental Illness Maternal Grandmother     Stroke Maternal Grandmother     Heart Disease Maternal Grandfather     Cancer Paternal Grandmother     Diabetes Paternal Grandmother     Diabetes Maternal Cousin        PHYSICAL EXAMINATION:  [ INSTRUCTIONS:  \"[x]\" Indicates a positive item  \"[]\" Indicates a negative item  -- DELETE ALL ITEMS NOT EXAMINED]  Vital Signs: (As obtained by patient/caregiver or practitioner observation)    Blood pressure-  Heart rate-    Respiratory rate-    Temperature-  Pulse oximetry-     Constitutional: [x] Appears well-developed and well-nourished [x] No apparent distress      [] Abnormal-   Mental status  [x] Alert and awake  [x] Oriented to person/place/time [x]Able to follow commands      Eyes:  EOM    [x]  Normal  [] Abnormal-  Sclera  [x]  Normal  [] Abnormal -         Discharge [x]  None visible  [] Abnormal -    HENT:   [x] Normocephalic, atraumatic.   [] Abnormal   [x] Mouth/Throat: Mucous membranes are moist.     External Ears [x] Normal  [] Abnormal-     Neck: [x] No visualized mass     Pulmonary/Chest: [x] Respiratory effort normal.  [x] No visualized signs of difficulty breathing or respiratory distress        [] Abnormal-      Musculoskeletal:   [x] Normal gait with no signs of ataxia         [x] Normal range of motion of neck        [] Abnormal- Neurological:        [x] No Facial Asymmetry (Cranial nerve 7 motor function) (limited exam to video visit)          [x] No gaze palsy        [] Abnormal-         Skin:        [x] No significant exanthematous lesions or discoloration noted on facial skin         [] Abnormal-            Psychiatric:       [x] Normal Affect [x] No Hallucinations        [] Abnormal-     Other pertinent observable physical exam findings-     ASSESSMENT/PLAN:   Diagnosis Orders   1. Obesity (BMI 30-39.9)  phentermine (ADIPEX-P) 37.5 MG tablet     No orders of the defined types were placed in this encounter. Requested Prescriptions     Signed Prescriptions Disp Refills    phentermine (ADIPEX-P) 37.5 MG tablet 30 tablet 0     Sig: Take 1 tablet by mouth every morning (before breakfast) for 30 days. Return in about 4 weeks (around 2/2/2021). Tolu Brownlee is a 39 y.o. female being evaluated by a Virtual Visit (video visit) encounter to address concerns as mentioned above. A caregiver was present when appropriate. Due to this being a TeleHealth encounter (During [address] emergency), evaluation of the following organ systems was limited: Vitals/Constitutional/EENT/Resp/CV/GI//MS/Neuro/Skin/Heme-Lymph-Imm. Pursuant to the emergency declaration under the [address] [place] [address] authority and the [employer] Act, this Virtual Visit was conducted with patient's (and/or legal guardian's) consent, to reduce the patient's risk of exposure to COVID-19 and provide necessary medical care. The patient (and/or legal guardian) has also been advised to contact this office for worsening conditions or problems, and seek emergency medical treatment and/or call 911 if deemed necessary.      Patient identification was verified at the start of the visit: Yes    Total time spent on this encounter: Not billed by time

## 2021-01-15 ENCOUNTER — HOSPITAL ENCOUNTER (OUTPATIENT)
Dept: MAMMOGRAPHY | Age: 46
Discharge: HOME | End: 2021-01-17
Payer: COMMERCIAL

## 2021-01-15 DIAGNOSIS — Z12.31 SCREENING MAMMOGRAM, ENCOUNTER FOR: ICD-10-CM

## 2021-01-15 PROCEDURE — 77063 BREAST TOMOSYNTHESIS BI: CPT

## 2021-06-09 DIAGNOSIS — I10 ESSENTIAL HYPERTENSION: ICD-10-CM

## 2021-06-09 RX ORDER — METOPROLOL SUCCINATE 50 MG/1
50 TABLET, EXTENDED RELEASE ORAL DAILY
Qty: 30 TABLET | Refills: 5 | Status: SHIPPED | OUTPATIENT
Start: 2021-06-09 | End: 2021-07-15

## 2021-06-13 PROCEDURE — 99284 EMERGENCY DEPT VISIT MOD MDM: CPT

## 2021-06-13 PROCEDURE — 93005 ELECTROCARDIOGRAM TRACING: CPT | Performed by: STUDENT IN AN ORGANIZED HEALTH CARE EDUCATION/TRAINING PROGRAM

## 2021-06-14 ENCOUNTER — APPOINTMENT (OUTPATIENT)
Dept: GENERAL RADIOLOGY | Age: 46
End: 2021-06-14
Payer: COMMERCIAL

## 2021-06-14 ENCOUNTER — HOSPITAL ENCOUNTER (EMERGENCY)
Age: 46
Discharge: HOME | End: 2021-06-14
Attending: EMERGENCY MEDICINE
Payer: COMMERCIAL

## 2021-06-14 VITALS
RESPIRATION RATE: 24 BRPM | HEART RATE: 68 BPM | WEIGHT: 200 LBS | DIASTOLIC BLOOD PRESSURE: 97 MMHG | TEMPERATURE: 98 F | OXYGEN SATURATION: 98 % | HEIGHT: 64 IN | SYSTOLIC BLOOD PRESSURE: 160 MMHG | BODY MASS INDEX: 34.15 KG/M2

## 2021-06-14 DIAGNOSIS — I16.0 HYPERTENSIVE URGENCY: Primary | ICD-10-CM

## 2021-06-14 LAB
ABSOLUTE EOS #: 0.16 K/UL (ref 0–0.44)
ABSOLUTE IMMATURE GRANULOCYTE: <0.03 K/UL (ref 0–0.3)
ABSOLUTE LYMPH #: 1.49 K/UL (ref 1.1–3.7)
ABSOLUTE MONO #: 0.5 K/UL (ref 0.1–1.2)
ALBUMIN SERPL-MCNC: 3.8 G/DL (ref 3.5–5.2)
ALBUMIN/GLOBULIN RATIO: 1.1 (ref 1–2.5)
ALP BLD-CCNC: 51 U/L (ref 35–104)
ALT SERPL-CCNC: 15 U/L (ref 5–33)
ANION GAP SERPL CALCULATED.3IONS-SCNC: 9 MMOL/L (ref 9–17)
AST SERPL-CCNC: 16 U/L
BASOPHILS # BLD: 0 % (ref 0–2)
BASOPHILS ABSOLUTE: <0.03 K/UL (ref 0–0.2)
BILIRUB SERPL-MCNC: 0.17 MG/DL (ref 0.3–1.2)
BUN BLDV-MCNC: 12 MG/DL (ref 6–20)
BUN/CREAT BLD: ABNORMAL (ref 9–20)
CALCIUM SERPL-MCNC: 8.8 MG/DL (ref 8.6–10.4)
CHLORIDE BLD-SCNC: 105 MMOL/L (ref 98–107)
CO2: 24 MMOL/L (ref 20–31)
CREAT SERPL-MCNC: 0.86 MG/DL (ref 0.5–0.9)
DIFFERENTIAL TYPE: ABNORMAL
EKG ATRIAL RATE: 79 BPM
EKG P AXIS: 29 DEGREES
EKG P-R INTERVAL: 132 MS
EKG Q-T INTERVAL: 418 MS
EKG QRS DURATION: 124 MS
EKG QTC CALCULATION (BAZETT): 479 MS
EKG R AXIS: 73 DEGREES
EKG T AXIS: 16 DEGREES
EKG VENTRICULAR RATE: 79 BPM
EOSINOPHILS RELATIVE PERCENT: 4 % (ref 1–4)
GFR AFRICAN AMERICAN: >60 ML/MIN
GFR NON-AFRICAN AMERICAN: >60 ML/MIN
GFR SERPL CREATININE-BSD FRML MDRD: ABNORMAL ML/MIN/{1.73_M2}
GFR SERPL CREATININE-BSD FRML MDRD: ABNORMAL ML/MIN/{1.73_M2}
GLUCOSE BLD-MCNC: 99 MG/DL (ref 70–99)
HCT VFR BLD CALC: 35.2 % (ref 36.3–47.1)
HEMOGLOBIN: 10.9 G/DL (ref 11.9–15.1)
IMMATURE GRANULOCYTES: 0 %
LYMPHOCYTES # BLD: 33 % (ref 24–43)
MCH RBC QN AUTO: 25.5 PG (ref 25.2–33.5)
MCHC RBC AUTO-ENTMCNC: 31 G/DL (ref 28.4–34.8)
MCV RBC AUTO: 82.4 FL (ref 82.6–102.9)
MONOCYTES # BLD: 11 % (ref 3–12)
NRBC AUTOMATED: 0 PER 100 WBC
PDW BLD-RTO: 14.6 % (ref 11.8–14.4)
PLATELET # BLD: 229 K/UL (ref 138–453)
PLATELET ESTIMATE: ABNORMAL
PMV BLD AUTO: 11.3 FL (ref 8.1–13.5)
POTASSIUM SERPL-SCNC: 4.1 MMOL/L (ref 3.7–5.3)
RBC # BLD: 4.27 M/UL (ref 3.95–5.11)
RBC # BLD: ABNORMAL 10*6/UL
SEG NEUTROPHILS: 52 % (ref 36–65)
SEGMENTED NEUTROPHILS ABSOLUTE COUNT: 2.4 K/UL (ref 1.5–8.1)
SODIUM BLD-SCNC: 138 MMOL/L (ref 135–144)
TOTAL PROTEIN: 7.4 G/DL (ref 6.4–8.3)
TROPONIN INTERP: NORMAL
TROPONIN T: NORMAL NG/ML
TROPONIN, HIGH SENSITIVITY: <6 NG/L (ref 0–14)
WBC # BLD: 4.6 K/UL (ref 3.5–11.3)
WBC # BLD: ABNORMAL 10*3/UL

## 2021-06-14 PROCEDURE — 84484 ASSAY OF TROPONIN QUANT: CPT

## 2021-06-14 PROCEDURE — 80053 COMPREHEN METABOLIC PANEL: CPT

## 2021-06-14 PROCEDURE — 93010 ELECTROCARDIOGRAM REPORT: CPT | Performed by: INTERNAL MEDICINE

## 2021-06-14 PROCEDURE — 6370000000 HC RX 637 (ALT 250 FOR IP): Performed by: STUDENT IN AN ORGANIZED HEALTH CARE EDUCATION/TRAINING PROGRAM

## 2021-06-14 PROCEDURE — 71045 X-RAY EXAM CHEST 1 VIEW: CPT

## 2021-06-14 PROCEDURE — 85025 COMPLETE CBC W/AUTO DIFF WBC: CPT

## 2021-06-14 RX ORDER — ACETAMINOPHEN 500 MG
1000 TABLET ORAL ONCE
Status: COMPLETED | OUTPATIENT
Start: 2021-06-14 | End: 2021-06-14

## 2021-06-14 RX ORDER — AMLODIPINE BESYLATE 10 MG/1
10 TABLET ORAL DAILY
Qty: 14 TABLET | Refills: 0 | Status: SHIPPED | OUTPATIENT
Start: 2021-06-14 | End: 2021-07-08 | Stop reason: SDUPTHER

## 2021-06-14 RX ORDER — AMLODIPINE BESYLATE 10 MG/1
10 TABLET ORAL ONCE
Status: COMPLETED | OUTPATIENT
Start: 2021-06-14 | End: 2021-06-14

## 2021-06-14 RX ADMIN — ACETAMINOPHEN 1000 MG: 500 TABLET ORAL at 00:56

## 2021-06-14 RX ADMIN — AMLODIPINE BESYLATE 10 MG: 10 TABLET ORAL at 01:27

## 2021-06-14 ASSESSMENT — ENCOUNTER SYMPTOMS
SHORTNESS OF BREATH: 0
NAUSEA: 1
RHINORRHEA: 0
COUGH: 0
ABDOMINAL PAIN: 0
BACK PAIN: 0
PHOTOPHOBIA: 0
VOMITING: 0

## 2021-06-14 ASSESSMENT — PAIN SCALES - GENERAL: PAINLEVEL_OUTOF10: 8

## 2021-06-14 NOTE — ED PROVIDER NOTES
[address] [facility]     Emergency Department     Faculty Attestation    I performed a history and physical examination of the patient and discussed management with the resident. I have reviewed and agree with the residents findings including all diagnostic interpretations, and treatment plans as written. Any areas of disagreement are noted on the chart. I was personally present for the key portions of any procedures. I have documented in the chart those procedures where I was not present during the key portions. I have reviewed the emergency nurses triage note. I agree with the chief complaint, past medical history, past surgical history, allergies, medications, social and family history as documented unless otherwise noted below. Documentation of the HPI, Physical Exam and Medical Decision Making performed by scribefrem is based on my personal performance of the HPI, PE and MDM. For Physician Assistant/ Nurse Practitioner cases/documentation I have personally evaluated this patient and have completed at least one if not all key elements of the E/M (history, physical exam, and MDM). Additional findings are as noted. 38 yo F Pt c/o mild ha gradual onset, no fever, hi bp, on metoprolol, intermittent cp, nausea, no syncope,   pe sbp 191, gcs 15 eomi, bud, neck supple, no pronator drift, speech clear, no calf swelling, no calf tenderness,     -bp improved, trop <6, s/s resolved, pt states she has pcp follow up, pt feels comfortable with dc home,     EKG Interpretation    Interpreted by me  Normal sinus, hr 79, no ischemia, rbbb, r axis, qtc 479    CRITICAL CARE: There was a high probability of clinically significant/life threatening deterioration in this patient's condition which required my urgent intervention. Total critical care time was 5 minutes. This excludes any time for separately reportable procedures.        Ashly 24, DO  06/14/21 [facility] [address], DO  06/14/21 1900

## 2021-06-14 NOTE — ED PROVIDER NOTES
[facility]  Emergency Department Encounter  Emergency Medicine Resident     Pt Name: Mark Camejo  MRN: [id number]  [place] 1975  Date of evaluation: 6/14/21  PCP:  Martinez Marin DO    CHIEF COMPLAINT       Chief Complaint   Patient presents with    Hypertension    Headache       HISTORY OF PRESENT ILLNESS  (Location/Symptom, Timing/Onset, Context/Setting, Quality, Duration, Modifying Factors, Severity.)    Mark Camejo is a 39 y.o. female who presents with intermittent chest pain, headache, neck pain, nausea. States been ongoing for the past several weeks. Denies any current chest pain but states she does having a slight headache. Patient believes this is secondary to her blood pressure. Does have a history of hypertension, currently on metoprolol XL, 50 mg daily and states she has been compliant with it. Denies any shortness of breath. Denies any shortness of breath with lying flat or with exertion. Currently denying any nausea or vomiting. States that she did feel lightheaded yesterday but is not currently feeling lightheaded or dizzy. PPE Worn:  Gloves: Yes  Eye Protection: Goggles  Mask: Surgical Mask  Gown: NO    PAST MEDICAL / SURGICAL / SOCIAL / FAMILY HISTORY    has a past medical history of Arthritis, Congestion of nasal sinus, Diabetes mellitus ([address] GERD (gastroesophageal reflux disease), Hyperlipidemia, Hypertension, Obesity, Sinus congestion, Sleep apnea, and Status post laparoscopic sleeve gastrectomy. has a past surgical history that includes knee surgery (Left); Breast surgery (Bilateral, 2005); and Sleeve Gastrectomy (4-18-16).     Social History     Socioeconomic History    Marital status: Single     Spouse name: Not on file    Number of children: Not on file    Years of education: Not on file    Highest education level: Not on file   Occupational History    Not on file   Tobacco Use    Smoking status: Former Smoker     Packs/day: 1.00     Years: 5.00     Pack years: 5.00     Quit date: 2014     Years since quittin.2    Smokeless tobacco: Never Used   Vaping Use    Vaping Use: Never used   Substance and Sexual Activity    Alcohol use: No    Drug use: No    Sexual activity: Yes   Other Topics Concern    Not on file   Social History Narrative    Not on file     Social Determinants of Health     Financial Resource Strain:     Difficulty of Paying Living Expenses:    Food Insecurity:     Worried About Running Out of Food in the Last Year:     Ran Out of Food in the Last Year:    Transportation Needs:     Lack of Transportation (Medical):  Lack of Transportation (Non-Medical):    Physical Activity:     Days of Exercise per Week:     Minutes of Exercise per Session:    Stress:     Feeling of Stress :    Social Connections:     Frequency of Communication with Friends and Family:     Frequency of Social Gatherings with Friends and Family:     Attends Mandaen Services:     Active Member of Clubs or Organizations:     Attends Club or Organization Meetings:     Marital Status:    Intimate Partner Violence:     Fear of Current or Ex-Partner:     Emotionally Abused:     Physically Abused:     Sexually Abused:        Family History   Problem Relation Age of Onset    High Cholesterol Mother     Arthritis Mother     High Blood Pressure Mother     Substance Abuse Father     Heart Disease Sister     Diabetes Maternal Aunt     Substance Abuse Maternal Uncle     Arthritis Maternal Grandmother     Depression Maternal Grandmother     Diabetes Maternal Grandmother     High Blood Pressure Maternal Grandmother     Mental Illness Maternal Grandmother     Stroke Maternal Grandmother     Heart Disease Maternal Grandfather     Cancer Paternal Grandmother     Diabetes Paternal Grandmother     Diabetes Maternal Cousin        Allergies:    Patient has no known allergies.     Home Medications:  Prior to Admission medications    Medication Sig Start Date End Date Taking? Authorizing Provider   amLODIPine (NORVASC) 10 MG tablet Take 1 tablet by mouth daily for 14 days 6/14/21 6/28/21 Yes Patrica Marquez MD   metoprolol succinate (TOPROL XL) 50 MG extended release tablet Take 1 tablet by mouth daily 6/9/21   Marisol Tinsley DO   pantoprazole (PROTONIX) 40 MG tablet Take 1 tablet by mouth daily 10/7/20   LESA De La Cruz CNP   sucralfate (CARAFATE) 1 GM tablet Take 1 tablet by mouth 4 times daily 10/7/20   LESA De La Cruz - CNP       REVIEW OF SYSTEMS    (2-9 systems for level 4, 10 or more for level 5)    Review of Systems   Constitutional: Negative for chills, fatigue and fever. HENT: Negative for congestion and rhinorrhea. Eyes: Negative for photophobia and visual disturbance. Respiratory: Negative for cough and shortness of breath. Cardiovascular: Positive for chest pain. Negative for palpitations and leg swelling. Gastrointestinal: Positive for nausea. Negative for abdominal pain and vomiting. Genitourinary: Negative for dysuria and flank pain. Musculoskeletal: Positive for neck pain. Negative for back pain, myalgias and neck stiffness. Skin: Negative for pallor. Neurological: Positive for headaches. Psychiatric/Behavioral: Negative for confusion. All other systems reviewed and are negative. PHYSICAL EXAM   (up to 7 for level 4, 8 or more for level 5)    INITIAL VITALS:   ED Triage Vitals [06/13/21 2355]   BP Temp Temp Source Pulse Resp SpO2 Height Weight   (!) 191/114 98 °F (36.7 °C) Infrared 81 16 100 % 5' 4\" (1.626 m) 200 lb (90.7 kg)       Physical Exam  Vitals and nursing note reviewed. Constitutional:       General: She is not in acute distress. Appearance: She is well-developed. She is not ill-appearing. Eyes:      General: No visual field deficit. Extraocular Movements: Extraocular movements intact. Right eye: Normal extraocular motion and no nystagmus. Left eye: Normal extraocular motion and no nystagmus. Pupils: Pupils are equal, round, and reactive to light. Neck:      Vascular: No carotid bruit or JVD. Cardiovascular:      Rate and Rhythm: Normal rate and regular rhythm. Pulses: Normal pulses. Radial pulses are 2+ on the right side and 2+ on the left side. Posterior tibial pulses are 2+ on the right side and 2+ on the left side. Heart sounds: Normal heart sounds. No murmur heard. No S3 or S4 sounds. Pulmonary:      Effort: Pulmonary effort is normal. No tachypnea or respiratory distress. Breath sounds: Normal breath sounds. No decreased breath sounds, wheezing or rales. Abdominal:      General: There is no distension. Palpations: Abdomen is soft. Tenderness: There is no abdominal tenderness. Musculoskeletal:      Cervical back: Normal range of motion and neck supple. Tenderness present. Muscular tenderness present. No spinous process tenderness. Right lower leg: No edema. Left lower leg: No edema. Skin:     General: Skin is warm and dry. Capillary Refill: Capillary refill takes less than 2 seconds. Neurological:      General: No focal deficit present. Mental Status: She is alert and oriented to person, place, and time. GCS: GCS eye subscore is 4. GCS verbal subscore is 5. GCS motor subscore is 6. Cranial Nerves: No dysarthria or facial asymmetry. Sensory: Sensation is intact. No sensory deficit. Motor: Motor function is intact. No tremor or pronator drift. Coordination: Finger-Nose-Finger Test and Heel to [place] beth Test normal.      Gait: Gait is intact. Deep Tendon Reflexes:      Reflex Scores:       Patellar reflexes are 2+ on the right side and 2+ on the left side. Achilles reflexes are 2+ on the right side and 2+ on the left side. Psychiatric:         Behavior: Behavior is cooperative.          DIFFERENTIAL  DIAGNOSIS   PLAN (LABS / IMAGING / EKG):  Orders Placed This Encounter   Procedures    XR CHEST PORTABLE    Troponin    CBC WITH AUTO DIFFERENTIAL    COMPREHENSIVE METABOLIC PANEL    EKG 12 Lead       MEDICATIONS ORDERED:  Orders Placed This Encounter   Medications    acetaminophen (TYLENOL) tablet 1,000 mg    amLODIPine (NORVASC) tablet 10 mg    amLODIPine (NORVASC) 10 MG tablet     Sig: Take 1 tablet by mouth daily for 14 days     Dispense:  14 tablet     Refill:  0         DIAGNOSTIC RESULTS / EMERGENCYDEPARTMENT COURSE / MDM   LABS:  Labs Reviewed   CBC WITH AUTO DIFFERENTIAL - Abnormal; Notable for the following components:       Result Value    Hemoglobin 10.9 (*)     Hematocrit 35.2 (*)     MCV 82.4 (*)     RDW 14.6 (*)     All other components within normal limits   COMPREHENSIVE METABOLIC PANEL - Abnormal; Notable for the following components: Total Bilirubin 0.17 (*)     All other components within normal limits   TROPONIN       RADIOLOGY:  XR CHEST PORTABLE    Result Date: 6/14/2021  EXAMINATION: ONE XRAY VIEW OF THE CHEST 6/14/2021 12:43 am COMPARISON: None. HISTORY: ORDERING SYSTEM PROVIDED HISTORY: chest pain FINDINGS: Heart size is normal. No focal consolidation in the lungs. No pleural effusion or pneumothorax. No acute abnormality. EKG    EKG Interpretation    Interpreted by me    Rhythm: normal sinus   Rate: normal, 79  Axis: normal  Ectopy: none  Conduction: Right bundle branch block  ST Segments: no acute change  T Waves: no acute change  Q Waves: none    Clinical Impression: Sinus rhythm rate of 79. No ST segment changes, no arrhythmia, no ectopy. Normal axis, poor R wave progression. Right bundle branch block appears new compared to EKG dated 5/8/2018    All EKG's are interpreted by the Emergency Department Physician whoeither signs or Co-signs this chart in the absence of a cardiologist.    Impression:  Patient presents with intermittent headache, chest pain, nausea.   Only symptom patient is currently having is a headache and some paraspinal tenderness in her neck. Will check cardiac work-up, chest x-ray, CMP, CBC, troponin. EKG. Patient is on metoprolol XL by her primary care provider. Patient is already on 50 mg daily. Heart rate in the room was in the 70s therefore and unsure about starting patient on a higher dose of metoprolol. If kidney function is adequate and potassium is okay, will consider IV Vasotec, starting on lisinopril after that. If kidney function is questionable, will start on amlodipine. Would prefer an ACE inhibitor since patient states she has a prior history of diabetes. EMERGENCY DEPARTMENT COURSE:   Blood pressure improved to 160/97 without intervention. Patient states that she feels significantly better. No AMI, no hyperkalemia. Spoke with patient about possibility of using amlodipine, lisinopril, or hydrochlorothiazide. After discussing dosages, side effects, patient and I agreed on utilizing amlodipine. Will start on 10 mg nightly. Discussed with patient that she needs to call her primary care provider tomorrow morning to make sure that the primary care provider is agreeable with amlodipine and to make any changes if needed. Patient states that she will do so. Instructed patient that the amlodipine may take over a day to fully take effect patient states that she understands. First dose given here. Patient safe for discharge. Strict return precautions given.     MDM  Number of Diagnoses or Management Options  Hypertensive urgency: new, needed workup     Amount and/or Complexity of Data Reviewed  Clinical lab tests: ordered and reviewed  Review and summarize past medical records: yes  Discuss the patient with other providers: yes  Independent visualization of images, tracings, or specimens: yes    Risk of Complications, Morbidity, and/or Mortality  Presenting problems: moderate  Diagnostic procedures: moderate  Management options: low    Patient Progress  Patient progress: stable      PROCEDURES:  none    CONSULTS:  None    CRITICAL CARE:  Please see attending note    FINAL IMPRESSION     1. Hypertensive urgency          DISPOSITION / PLAN   DISPOSITION Decision To Discharge 06/14/2021 01:28:43 AM      Evaluation and treatment course in the ED, and plan of care upon discharge was discussed in length with the patient. Patient had no further questions prior to being discharged and was instructed to return to the ED for new or worsening symptoms. Any changes to existing medications or new prescriptions were reviewed with patient and they expressed understanding of how to correctly take their medications and the possible side effects.     PATIENT REFERRED TO:  Deb Spain DO  [address] [place] [id number]  [phone]    Call in 1 day        DISCHARGE MEDICATIONS:  Discharge Medication List as of 6/14/2021  1:30 AM      START taking these medications    Details   amLODIPine (NORVASC) 10 MG tablet Take 1 tablet by mouth daily for 14 days, Disp-14 tablet, R-0Print             Anthony Vyas DO  Emergency Medicine Resident Physician, PGY-3    (Please note that portions of this note were completed with a voice recognition program.  Efforts were made to edit the dictations but occasionally words are mis-transcribed.)         Anthony Vyas MD  06/14/21 5332

## 2021-06-24 ENCOUNTER — TELEMEDICINE (OUTPATIENT)
Dept: FAMILY MEDICINE CLINIC | Age: 46
End: 2021-06-24
Payer: COMMERCIAL

## 2021-06-24 DIAGNOSIS — I10 ESSENTIAL HYPERTENSION: Primary | ICD-10-CM

## 2021-06-24 PROCEDURE — 99213 OFFICE O/P EST LOW 20 MIN: CPT | Performed by: FAMILY MEDICINE

## 2021-06-24 RX ORDER — LOSARTAN POTASSIUM 50 MG/1
50 TABLET ORAL DAILY
Qty: 30 TABLET | Refills: 0 | Status: SHIPPED | OUTPATIENT
Start: 2021-06-24 | End: 2021-07-08 | Stop reason: SDUPTHER

## 2021-06-24 SDOH — ECONOMIC STABILITY: FOOD INSECURITY: WITHIN THE PAST 12 MONTHS, THE FOOD YOU BOUGHT JUST DIDN'T LAST AND YOU DIDN'T HAVE MONEY TO GET MORE.: NEVER TRUE

## 2021-06-24 SDOH — ECONOMIC STABILITY: FOOD INSECURITY: WITHIN THE PAST 12 MONTHS, YOU WORRIED THAT YOUR FOOD WOULD RUN OUT BEFORE YOU GOT MONEY TO BUY MORE.: NEVER TRUE

## 2021-06-24 ASSESSMENT — SOCIAL DETERMINANTS OF HEALTH (SDOH): HOW HARD IS IT FOR YOU TO PAY FOR THE VERY BASICS LIKE FOOD, HOUSING, MEDICAL CARE, AND HEATING?: NOT VERY HARD

## 2021-06-25 DIAGNOSIS — R30.0 DYSURIA: Primary | ICD-10-CM

## 2021-06-28 ENCOUNTER — HOSPITAL ENCOUNTER (OUTPATIENT)
Age: 46
Discharge: HOME | End: 2021-06-28
Payer: COMMERCIAL

## 2021-06-28 DIAGNOSIS — R30.0 DYSURIA: ICD-10-CM

## 2021-06-28 LAB
BILIRUBIN URINE: NEGATIVE
COLOR: YELLOW
COMMENT UA: NORMAL
GLUCOSE URINE: NEGATIVE
KETONES, URINE: NEGATIVE
LEUKOCYTE ESTERASE, URINE: NEGATIVE
NITRITE, URINE: NEGATIVE
PH UA: 6 (ref 5–8)
PROTEIN UA: NEGATIVE
SPECIFIC GRAVITY UA: 1.02 (ref 1–1.03)
TURBIDITY: CLEAR
URINE HGB: NEGATIVE
UROBILINOGEN, URINE: NORMAL

## 2021-06-28 PROCEDURE — 81003 URINALYSIS AUTO W/O SCOPE: CPT

## 2021-06-28 NOTE — PROGRESS NOTES
2021    TELEHEALTH EVALUATION -- Audio/Visual (During FWPKB-08 public health emergency)    HPI:    Tg Lazo (:  1975) has requested an audio/video evaluation for the following concern(s):    He is being seen in virtual visit today stating her blood pressures have been running high she went into the emergency room they started her on amlodipine she does have a history of having issues with edema  She states it is still running somewhat high today she was able to have it checked at work    Review of Systems    Prior to Visit Medications    Medication Sig Taking?  Authorizing Provider   losartan (COZAAR) 50 MG tablet Take 1 tablet by mouth daily Yes Marisol Tinsley DO   amLODIPine (NORVASC) 10 MG tablet Take 1 tablet by mouth daily for 14 days Yes Caprice Alexandra MD   metoprolol succinate (TOPROL XL) 50 MG extended release tablet Take 1 tablet by mouth daily  Patient not taking: Reported on 2021  Marisol Tinsley DO   pantoprazole (PROTONIX) 40 MG tablet Take 1 tablet by mouth daily  Patient not taking: Reported on 2021  LESA Lozada CNP   sucralfate (CARAFATE) 1 GM tablet Take 1 tablet by mouth 4 times daily  Patient not taking: Reported on 2021  LESA Lozada CNP       Social History     Tobacco Use    Smoking status: Former Smoker     Packs/day: 1.00     Years: 5.00     Pack years: 5.00     Quit date: 2014     Years since quittin.2    Smokeless tobacco: Never Used   Vaping Use    Vaping Use: Never used   Substance Use Topics    Alcohol use: No    Drug use: No        No Known Allergies,   Past Medical History:   Diagnosis Date    Arthritis     right knee    Congestion of nasal sinus     Diabetes mellitus ([facility] [address]     GERD (gastroesophageal reflux disease)     Hyperlipidemia     Hypertension     Obesity     Sinus congestion     Sleep apnea     C pap machine    Status post laparoscopic sleeve gastrectomy 16    Dr. Pradeep Desai, [facility]. [facility] [place] wt = 224lb, initial wt = 219lb   ,   Past Surgical History:   Procedure Laterality Date    BREAST SURGERY Bilateral     breast reduction    KNEE SURGERY Left     torn ligaments    SLEEVE GASTRECTOMY  16    with EGD, liver Bx, lap marco a   ,   Social History     Tobacco Use    Smoking status: Former Smoker     Packs/day: 1.00     Years: 5.00     Pack years: 5.00     Quit date: 2014     Years since quittin.2    Smokeless tobacco: Never Used   Vaping Use    Vaping Use: Never used   Substance Use Topics    Alcohol use: No    Drug use: No   ,   Family History   Problem Relation Age of Onset    High Cholesterol Mother     Arthritis Mother     High Blood Pressure Mother     Substance Abuse Father     Heart Disease Sister     Diabetes Maternal Aunt     Substance Abuse Maternal Uncle     Arthritis Maternal Grandmother     Depression Maternal Grandmother     Diabetes Maternal Grandmother     High Blood Pressure Maternal Grandmother     Mental Illness Maternal Grandmother     Stroke Maternal Grandmother     Heart Disease Maternal Grandfather     Cancer Paternal Grandmother     Diabetes Paternal Grandmother     Diabetes Maternal Cousin        PHYSICAL EXAMINATION:  [ INSTRUCTIONS:  \"[x]\" Indicates a positive item  \"[]\" Indicates a negative item  -- DELETE ALL ITEMS NOT EXAMINED]  Vital Signs: (As obtained by patient/caregiver or practitioner observation)    Blood pressure-  Heart rate-    Respiratory rate-    Temperature-  Pulse oximetry-     Constitutional: [x] Appears well-developed and well-nourished [x] No apparent distress      [] Abnormal-   Mental status  [x] Alert and awake  [x] Oriented to person/place/time [x]Able to follow commands      Eyes:  EOM    [x]  Normal  [] Abnormal-  Sclera  [x]  Normal  [] Abnormal -         Discharge [x]  None visible  [] Abnormal -    HENT:   [x] Normocephalic, atraumatic.   [] Abnormal   [x] Mouth/Throat: Mucous membranes are moist. External Ears [x] Normal  [] Abnormal-     Neck: [x] No visualized mass     Pulmonary/Chest: [x] Respiratory effort normal.  [x] No visualized signs of difficulty breathing or respiratory distress        [] Abnormal-      Musculoskeletal:   [x] Normal gait with no signs of ataxia         [x] Normal range of motion of neck        [] Abnormal-       Neurological:        [x] No Facial Asymmetry (Cranial nerve 7 motor function) (limited exam to video visit)          [x] No gaze palsy        [] Abnormal-         Skin:        [x] No significant exanthematous lesions or discoloration noted on facial skin         [] Abnormal-            Psychiatric:       [x] Normal Affect [x] No Hallucinations        [] Abnormal-     Other pertinent observable physical exam findings-     ASSESSMENT/PLAN:   Diagnosis Orders   1. Essential hypertension        No orders of the defined types were placed in this encounter. Requested Prescriptions     Signed Prescriptions Disp Refills    losartan (COZAAR) 50 MG tablet 30 tablet 0     Sig: Take 1 tablet by mouth daily     She is to stop the amlodipine start losartan continue metoprolol and do a nurse visit follow-up in the office in 3 to 4 weeks  Return in about 4 weeks (around 7/22/2021) for htn. Mansi Herbert is a 39 y.o. female being evaluated by a Virtual Visit (video visit) encounter to address concerns as mentioned above. A caregiver was present when appropriate. Due to this being a TeleHealth encounter (During JWQ-15 public health emergency), evaluation of the following organ systems was limited: Vitals/Constitutional/EENT/Resp/CV/GI//MS/Neuro/Skin/Heme-Lymph-Imm.   Pursuant to the emergency declaration under the [address] [place] [address] and the [employer] Act, this Virtual Visit was conducted with patient's (and/or legal guardian's) consent, to reduce the patient's risk of exposure to COVID-19 and provide necessary medical care. The patient (and/or legal guardian) has also been advised to contact this office for worsening conditions or problems, and seek emergency medical treatment and/or call 911 if deemed necessary. Patient identification was verified at the start of the visit: Yes    Total time spent on this encounter: Not billed by time    Services were provided through a video synchronous discussion virtually to substitute for in-person clinic visit. Patient and provider were located at their individual homes. --Mohit Velázquez DO on 6/28/2021 at 1:32 PM    An electronic signature was used to authenticate this note.

## 2021-07-07 ENCOUNTER — TELEPHONE (OUTPATIENT)
Dept: FAMILY MEDICINE CLINIC | Age: 46
End: 2021-07-07

## 2021-07-07 NOTE — TELEPHONE ENCOUNTER
Pt called because you changed her medication. She said the ER said the metoprolol was no longer working and they gave her amlodipine but she stated you put her back on the metoprolol. She is taking the metoprolol and losartan together and it's causing her blood pressure to be high and a severe headache she feels she can't function. Please advise.

## 2021-07-08 ENCOUNTER — NURSE ONLY (OUTPATIENT)
Dept: FAMILY MEDICINE CLINIC | Age: 46
End: 2021-07-08

## 2021-07-08 ENCOUNTER — TELEPHONE (OUTPATIENT)
Dept: FAMILY MEDICINE CLINIC | Age: 46
End: 2021-07-08

## 2021-07-08 VITALS — SYSTOLIC BLOOD PRESSURE: 128 MMHG | DIASTOLIC BLOOD PRESSURE: 74 MMHG

## 2021-07-08 DIAGNOSIS — I10 ESSENTIAL HYPERTENSION: Primary | ICD-10-CM

## 2021-07-08 RX ORDER — AMLODIPINE BESYLATE 10 MG/1
10 TABLET ORAL DAILY
Qty: 30 TABLET | Refills: 0 | Status: SHIPPED | OUTPATIENT
Start: 2021-07-08 | End: 2021-08-09

## 2021-07-08 RX ORDER — LOSARTAN POTASSIUM 50 MG/1
50 TABLET ORAL DAILY
Qty: 30 TABLET | Refills: 0 | Status: SHIPPED | OUTPATIENT
Start: 2021-07-08 | End: 2021-08-09

## 2021-07-08 NOTE — TELEPHONE ENCOUNTER
Spoke to patient and she willing to take both medication she states she needs refills on the losartan

## 2021-07-08 NOTE — TELEPHONE ENCOUNTER
Pt seen for bp check. 135/88 on machine 128/74 manually. Pt asks if she can go back on amlodipine because she had normal readings and no headaches. Metoprolol gives her  headaches and dizziness. Pt has been taking metoprolol and losartan. When she takes only losartan no headaches but high bp readings.  Please advise

## 2021-07-15 ENCOUNTER — OFFICE VISIT (OUTPATIENT)
Dept: BARIATRICS/WEIGHT MGMT | Age: 46
End: 2021-07-15
Payer: COMMERCIAL

## 2021-07-15 VITALS
DIASTOLIC BLOOD PRESSURE: 80 MMHG | WEIGHT: 202 LBS | HEIGHT: 64 IN | BODY MASS INDEX: 34.49 KG/M2 | HEART RATE: 96 BPM | SYSTOLIC BLOOD PRESSURE: 120 MMHG

## 2021-07-15 DIAGNOSIS — E78.5 DYSLIPIDEMIA: ICD-10-CM

## 2021-07-15 DIAGNOSIS — M54.50 CHRONIC LOW BACK PAIN, UNSPECIFIED BACK PAIN LATERALITY, UNSPECIFIED WHETHER SCIATICA PRESENT: ICD-10-CM

## 2021-07-15 DIAGNOSIS — E66.9 OBESITY (BMI 30-39.9): ICD-10-CM

## 2021-07-15 DIAGNOSIS — I10 ESSENTIAL HYPERTENSION: Primary | ICD-10-CM

## 2021-07-15 DIAGNOSIS — K59.00 CONSTIPATION, UNSPECIFIED CONSTIPATION TYPE: ICD-10-CM

## 2021-07-15 DIAGNOSIS — G89.29 CHRONIC LOW BACK PAIN, UNSPECIFIED BACK PAIN LATERALITY, UNSPECIFIED WHETHER SCIATICA PRESENT: ICD-10-CM

## 2021-07-15 DIAGNOSIS — K21.9 GASTROESOPHAGEAL REFLUX DISEASE, UNSPECIFIED WHETHER ESOPHAGITIS PRESENT: ICD-10-CM

## 2021-07-15 DIAGNOSIS — Z98.84 STATUS POST LAPAROSCOPIC SLEEVE GASTRECTOMY: ICD-10-CM

## 2021-07-15 PROCEDURE — 99213 OFFICE O/P EST LOW 20 MIN: CPT | Performed by: NURSE PRACTITIONER

## 2021-07-15 RX ORDER — DOCUSATE SODIUM 100 MG/1
100 CAPSULE, LIQUID FILLED ORAL DAILY PRN
Qty: 30 CAPSULE | Refills: 5 | Status: SHIPPED | OUTPATIENT
Start: 2021-07-15 | End: 2021-11-16 | Stop reason: SDUPTHER

## 2021-07-15 RX ORDER — PANTOPRAZOLE SODIUM 40 MG/1
40 TABLET, DELAYED RELEASE ORAL DAILY
Qty: 30 TABLET | Refills: 5 | Status: SHIPPED | OUTPATIENT
Start: 2021-07-15

## 2021-07-15 NOTE — PROGRESS NOTES
Medical Nutrition Therapy  Metabolic and Bariatric surgery  Annual follow up note         Pt reports: No concerns         Vitals:   Vitals:    07/15/21 1631   BP: 120/80   Site: Right Upper Arm   Position: Sitting   Cuff Size: Large Adult   Pulse: 96   Weight: 202 lb (91.6 kg)   Height: 5' 4\" (1.626 m)      Body mass index is 34.67 kg/m². Labs reviewed:     Multivitamin/mineral intake:MV w iron  Calcium intake:   Pt instructed to take TUMS  Other:            Nutrition Assessment:   PES: Inadequate food and beverage intake r/t WLS as evidenced by loss of excess body weight lost 17 lbs over 5 years. Goals   60-80gm of protein  48-64oz of fluid  Vitamin adherance  Basic adherance to WLS behavious and this document has been scanned into the chart.        [x] met     []  Not met        Plan:   F/u annually         Bharti Lopez RD, LD

## 2021-07-30 ENCOUNTER — HOSPITAL ENCOUNTER (OUTPATIENT)
Age: 46
Discharge: HOME | End: 2021-07-30
Payer: COMMERCIAL

## 2021-07-30 DIAGNOSIS — E78.5 DYSLIPIDEMIA: ICD-10-CM

## 2021-07-30 DIAGNOSIS — Z98.84 STATUS POST LAPAROSCOPIC SLEEVE GASTRECTOMY: ICD-10-CM

## 2021-07-30 DIAGNOSIS — M54.50 CHRONIC LOW BACK PAIN, UNSPECIFIED BACK PAIN LATERALITY, UNSPECIFIED WHETHER SCIATICA PRESENT: ICD-10-CM

## 2021-07-30 DIAGNOSIS — I10 ESSENTIAL HYPERTENSION: ICD-10-CM

## 2021-07-30 DIAGNOSIS — G89.29 CHRONIC LOW BACK PAIN, UNSPECIFIED BACK PAIN LATERALITY, UNSPECIFIED WHETHER SCIATICA PRESENT: ICD-10-CM

## 2021-07-30 DIAGNOSIS — E66.9 OBESITY (BMI 30-39.9): ICD-10-CM

## 2021-07-30 DIAGNOSIS — E55.9 VITAMIN D DEFICIENCY: Primary | ICD-10-CM

## 2021-07-30 LAB
ABSOLUTE EOS #: 0.07 K/UL (ref 0–0.44)
ABSOLUTE IMMATURE GRANULOCYTE: <0.03 K/UL (ref 0–0.3)
ABSOLUTE LYMPH #: 1.19 K/UL (ref 1.1–3.7)
ABSOLUTE MONO #: 0.33 K/UL (ref 0.1–1.2)
ALBUMIN SERPL-MCNC: 4.1 G/DL (ref 3.5–5.2)
ALBUMIN/GLOBULIN RATIO: 1.1 (ref 1–2.5)
ALP BLD-CCNC: 66 U/L (ref 35–104)
ALT SERPL-CCNC: 13 U/L (ref 5–33)
ANION GAP SERPL CALCULATED.3IONS-SCNC: 17 MMOL/L (ref 9–17)
AST SERPL-CCNC: 16 U/L
BASOPHILS # BLD: 1 % (ref 0–2)
BASOPHILS ABSOLUTE: <0.03 K/UL (ref 0–0.2)
BILIRUB SERPL-MCNC: 0.32 MG/DL (ref 0.3–1.2)
BUN BLDV-MCNC: 10 MG/DL (ref 6–20)
BUN/CREAT BLD: ABNORMAL (ref 9–20)
CALCIUM SERPL-MCNC: 9.2 MG/DL (ref 8.6–10.4)
CHLORIDE BLD-SCNC: 105 MMOL/L (ref 98–107)
CHOLESTEROL/HDL RATIO: 4.2
CHOLESTEROL: 220 MG/DL
CO2: 20 MMOL/L (ref 20–31)
CREAT SERPL-MCNC: 1.22 MG/DL (ref 0.5–0.9)
DIFFERENTIAL TYPE: ABNORMAL
EOSINOPHILS RELATIVE PERCENT: 2 % (ref 1–4)
ESTIMATED AVERAGE GLUCOSE: 120 MG/DL
FOLATE: 16.5 NG/ML
GFR AFRICAN AMERICAN: 58 ML/MIN
GFR NON-AFRICAN AMERICAN: 48 ML/MIN
GFR SERPL CREATININE-BSD FRML MDRD: ABNORMAL ML/MIN/{1.73_M2}
GFR SERPL CREATININE-BSD FRML MDRD: ABNORMAL ML/MIN/{1.73_M2}
GLUCOSE BLD-MCNC: 84 MG/DL (ref 70–99)
HBA1C MFR BLD: 5.8 % (ref 4–6)
HCT VFR BLD CALC: 34 % (ref 36.3–47.1)
HDLC SERPL-MCNC: 52 MG/DL
HEMOGLOBIN: 10.7 G/DL (ref 11.9–15.1)
IMMATURE GRANULOCYTES: 0 %
IRON SATURATION: 24 % (ref 20–55)
IRON: 62 UG/DL (ref 37–145)
LDL CHOLESTEROL: 151 MG/DL (ref 0–130)
LYMPHOCYTES # BLD: 31 % (ref 24–43)
MAGNESIUM: 2.3 MG/DL (ref 1.6–2.6)
MCH RBC QN AUTO: 26.4 PG (ref 25.2–33.5)
MCHC RBC AUTO-ENTMCNC: 31.5 G/DL (ref 28.4–34.8)
MCV RBC AUTO: 83.7 FL (ref 82.6–102.9)
MONOCYTES # BLD: 9 % (ref 3–12)
NRBC AUTOMATED: 0 PER 100 WBC
PDW BLD-RTO: 14.3 % (ref 11.8–14.4)
PLATELET # BLD: 226 K/UL (ref 138–453)
PLATELET ESTIMATE: ABNORMAL
PMV BLD AUTO: 11 FL (ref 8.1–13.5)
POTASSIUM SERPL-SCNC: 4.1 MMOL/L (ref 3.7–5.3)
PTH INTACT: 77.58 PG/ML (ref 15–65)
RBC # BLD: 4.06 M/UL (ref 3.95–5.11)
RBC # BLD: ABNORMAL 10*6/UL
SEG NEUTROPHILS: 57 % (ref 36–65)
SEGMENTED NEUTROPHILS ABSOLUTE COUNT: 2.28 K/UL (ref 1.5–8.1)
SODIUM BLD-SCNC: 142 MMOL/L (ref 135–144)
TOTAL IRON BINDING CAPACITY: 256 UG/DL (ref 250–450)
TOTAL PROTEIN: 7.9 G/DL (ref 6.4–8.3)
TRIGL SERPL-MCNC: 83 MG/DL
TSH SERPL DL<=0.05 MIU/L-ACNC: 0.62 MIU/L (ref 0.3–5)
UNSATURATED IRON BINDING CAPACITY: 194 UG/DL (ref 112–347)
VITAMIN B-12: 492 PG/ML (ref 232–1245)
VITAMIN D 25-HYDROXY: 18 NG/ML (ref 30–100)
VLDLC SERPL CALC-MCNC: ABNORMAL MG/DL (ref 1–30)
WBC # BLD: 3.9 K/UL (ref 3.5–11.3)
WBC # BLD: ABNORMAL 10*3/UL

## 2021-07-30 PROCEDURE — 83540 ASSAY OF IRON: CPT

## 2021-07-30 PROCEDURE — 84425 ASSAY OF VITAMIN B-1: CPT

## 2021-07-30 PROCEDURE — 83735 ASSAY OF MAGNESIUM: CPT

## 2021-07-30 PROCEDURE — 85025 COMPLETE CBC W/AUTO DIFF WBC: CPT

## 2021-07-30 PROCEDURE — 83970 ASSAY OF PARATHORMONE: CPT

## 2021-07-30 PROCEDURE — 83036 HEMOGLOBIN GLYCOSYLATED A1C: CPT

## 2021-07-30 PROCEDURE — 84443 ASSAY THYROID STIM HORMONE: CPT

## 2021-07-30 PROCEDURE — 82746 ASSAY OF FOLIC ACID SERUM: CPT

## 2021-07-30 PROCEDURE — 80061 LIPID PANEL: CPT

## 2021-07-30 PROCEDURE — 36415 COLL VENOUS BLD VENIPUNCTURE: CPT

## 2021-07-30 PROCEDURE — 84590 ASSAY OF VITAMIN A: CPT

## 2021-07-30 PROCEDURE — 84630 ASSAY OF ZINC: CPT

## 2021-07-30 PROCEDURE — 82306 VITAMIN D 25 HYDROXY: CPT

## 2021-07-30 PROCEDURE — 83550 IRON BINDING TEST: CPT

## 2021-07-30 PROCEDURE — 80053 COMPREHEN METABOLIC PANEL: CPT

## 2021-07-30 PROCEDURE — 82607 VITAMIN B-12: CPT

## 2021-07-30 RX ORDER — ERGOCALCIFEROL 1.25 MG/1
50000 CAPSULE ORAL WEEKLY
Qty: 8 CAPSULE | Refills: 0 | Status: SHIPPED | OUTPATIENT
Start: 2021-07-30 | End: 2021-11-18 | Stop reason: ALTCHOICE

## 2021-08-02 LAB
RETINYL PALMITATE: <0.02 MG/L (ref 0–0.1)
VITAMIN A LEVEL: 0.46 MG/L (ref 0.3–1.2)
VITAMIN A, INTERP: NORMAL

## 2021-08-03 LAB — ZINC: 85.6 UG/DL (ref 60–120)

## 2021-08-05 LAB — VITAMIN B1 WHOLE BLOOD: 65 NMOL/L (ref 70–180)

## 2021-08-24 ENCOUNTER — TELEPHONE (OUTPATIENT)
Dept: FAMILY MEDICINE CLINIC | Age: 46
End: 2021-08-24

## 2021-08-24 ENCOUNTER — PATIENT MESSAGE (OUTPATIENT)
Dept: FAMILY MEDICINE CLINIC | Age: 46
End: 2021-08-24

## 2021-08-24 DIAGNOSIS — F43.21 GRIEF REACTION: Primary | ICD-10-CM

## 2021-08-24 RX ORDER — ALPRAZOLAM 0.5 MG/1
0.5 TABLET ORAL NIGHTLY PRN
Qty: 15 TABLET | Refills: 0 | Status: SHIPPED | OUTPATIENT
Start: 2021-08-24 | End: 2021-09-17 | Stop reason: SDUPTHER

## 2021-08-24 RX ORDER — CITALOPRAM 20 MG/1
20 TABLET ORAL DAILY
Qty: 30 TABLET | Refills: 1 | Status: SHIPPED | OUTPATIENT
Start: 2021-08-24 | End: 2021-11-16 | Stop reason: SDUPTHER

## 2021-08-24 NOTE — TELEPHONE ENCOUNTER
----- Message from Alonzo George sent at 8/24/2021  9:11 AM EDT -----  Subject: Message to Provider    QUESTIONS  Information for Provider? Pt requesting a prescription from their pcp due   to stress levels. Pt states their granddaughter has recently passed away   and is now having troubles with sleeping. pt would also like to know if   they can be given a note to be off work for 4-6 weeks. Pt has been   scheduled for the next available appt 8/31/2021 with their pcp. please   advise.   ---------------------------------------------------------------------------  --------------  CALL BACK INFO  What is the best way for the office to contact you? OK to leave message on   voicemail  Preferred Call Back Phone Number? [phone]  ---------------------------------------------------------------------------  --------------  SCRIPT ANSWERS  Relationship to Patient?  Self

## 2021-08-24 NOTE — TELEPHONE ENCOUNTER
I sent her in some medications  She has to keep her appointment we will address the amount of time she needs off at that time

## 2021-08-24 NOTE — TELEPHONE ENCOUNTER
From: Elgin Ruano  To: Judi Arias DO  Sent: 8/24/2021 10:04 AM EDT  Subject: [place] Dr. Patsy Scarlet can you please consider getting me in for a visit as soon as possible. I can't go on like this for another Seven days. I'm not able sleep and my nerves are on edge. My 3year old granddaughter just passed away and she was always with me. I'm totally stressing and crying I need time off work and whatever help I can get for my nerves and sleeping. I still have two more grandchildren that live with me and I just need to get better for them.

## 2021-08-31 ENCOUNTER — TELEMEDICINE (OUTPATIENT)
Dept: FAMILY MEDICINE CLINIC | Age: 46
End: 2021-08-31
Payer: COMMERCIAL

## 2021-08-31 DIAGNOSIS — F43.21 GRIEF REACTION: Primary | ICD-10-CM

## 2021-08-31 PROCEDURE — 99213 OFFICE O/P EST LOW 20 MIN: CPT | Performed by: FAMILY MEDICINE

## 2021-08-31 ASSESSMENT — PATIENT HEALTH QUESTIONNAIRE - PHQ9
SUM OF ALL RESPONSES TO PHQ9 QUESTIONS 1 & 2: 6
1. LITTLE INTEREST OR PLEASURE IN DOING THINGS: 3
2. FEELING DOWN, DEPRESSED OR HOPELESS: 3
SUM OF ALL RESPONSES TO PHQ QUESTIONS 1-9: 6

## 2021-08-31 NOTE — PROGRESS NOTES
2021    TELEHEALTH EVALUATION -- Audio/Visual (During [facility]DWD-53 public health emergency)    HPI:    Yee Martin (:  1975) has requested an audio/video evaluation for the following concern(s):    Seen today for grief her 3year-old granddaughter  from an accidental drug overdose patient had had her living with her for the last year and a half and the mother and child had just recently moved out it is her son's child apparently the child got a hold of a bag of some sort of drugs and the mother had no idea and they did not find the child until he was dead apparently there was some plastic found in the mouth where she had taken a bite out of the bed of drugs the very unfortunate and terrible thing is is they found the child dead at 38 and never called EMS until 3:30 in the morning  Patient has been having an extremely difficult time dealing with the whole situation    Review of Systems    Prior to Visit Medications    Medication Sig Taking? Authorizing Provider   citalopram (CELEXA) 20 MG tablet Take 1 tablet by mouth daily  Marisol Tinsley DO   ALPRAZolam (XANAX) 0.5 MG tablet Take 1 tablet by mouth nightly as needed for Sleep for up to 30 days.   Marisol Tinsley DO   losartan (COZAAR) 50 MG tablet Take 1 tablet by mouth once daily  Marisol Tinsley DO   amLODIPine (NORVASC) 10 MG tablet Take 1 tablet by mouth once daily  Marisol Tinsley DO   vitamin D (ERGOCALCIFEROL) 1.25 MG (17414 UT) CAPS capsule Take 1 capsule by mouth once a week for 8 doses  LESA Riggs CNP   pantoprazole (PROTONIX) 40 MG tablet Take 1 tablet by mouth daily  LESA Riggs CNP   docusate sodium (COLACE) 100 MG capsule Take 1 capsule by mouth daily as needed for Constipation  LESA Riggs CNP       Social History     Tobacco Use    Smoking status: Former Smoker     Packs/day: 1.00     Years: 5.00     Pack years: 5.00     Quit date: 2014     Years since quittin.4    Smokeless tobacco: Never Used   Vaping Use    Vaping Use: Never used   Substance Use Topics    Alcohol use: No    Drug use: No        No Known Allergies,   Past Medical History:   Diagnosis Date    Arthritis     right knee    Congestion of nasal sinus     Diabetes mellitus (HCC)     GERD (gastroesophageal reflux disease)     Hyperlipidemia     Hypertension     Obesity     Sinus congestion     Sleep apnea     C pap machine    Status post laparoscopic sleeve gastrectomy 16    Dr. Jordyn Navarro, hosp wt = 224lb, initial wt = 219lb   ,   Past Surgical History:   Procedure Laterality Date    BREAST SURGERY Bilateral     breast reduction    KNEE SURGERY Left     torn ligaments    SLEEVE GASTRECTOMY  16    with EGD, liver Bx, lap marco a   ,   Social History     Tobacco Use    Smoking status: Former Smoker     Packs/day: 1.00     Years: 5.00     Pack years: 5.00     Quit date: 2014     Years since quittin.4    Smokeless tobacco: Never Used   Vaping Use    Vaping Use: Never used   Substance Use Topics    Alcohol use: No    Drug use: No   ,   Family History   Problem Relation Age of Onset    High Cholesterol Mother     Arthritis Mother     High Blood Pressure Mother     Substance Abuse Father     Heart Disease Sister     Diabetes Maternal Aunt     Substance Abuse Maternal Uncle     Arthritis Maternal Grandmother     Depression Maternal Grandmother     Diabetes Maternal Grandmother     High Blood Pressure Maternal Grandmother     Mental Illness Maternal Grandmother     Stroke Maternal Grandmother     Heart Disease Maternal Grandfather     Cancer Paternal Grandmother     Diabetes Paternal Grandmother     Diabetes Maternal Cousin        PHYSICAL EXAMINATION:  [ INSTRUCTIONS:  \"[x]\" Indicates a positive item  \"[]\" Indicates a negative item  -- DELETE ALL ITEMS NOT EXAMINED]  Vital Signs: (As obtained by patient/caregiver or practitioner observation)    Blood pressure-  Heart rate- Respiratory rate-    Temperature-  Pulse oximetry-     Constitutional: [x] Appears well-developed and well-nourished [x] No apparent distress      [] Abnormal-   Mental status  [x] Alert and awake  [x] Oriented to person/place/time [x]Able to follow commands      Eyes:  EOM    [x]  Normal  [] Abnormal-  Sclera  [x]  Normal  [] Abnormal -         Discharge [x]  None visible  [] Abnormal -    HENT:   [x] Normocephalic, atraumatic. [] Abnormal   [x] Mouth/Throat: Mucous membranes are moist.     External Ears [x] Normal  [] Abnormal-     Neck: [x] No visualized mass     Pulmonary/Chest: [x] Respiratory effort normal.  [x] No visualized signs of difficulty breathing or respiratory distress        [] Abnormal-      Musculoskeletal:   [x] Normal gait with no signs of ataxia         [x] Normal range of motion of neck        [] Abnormal-       Neurological:        [x] No Facial Asymmetry (Cranial nerve 7 motor function) (limited exam to video visit)          [x] No gaze palsy        [] Abnormal-         Skin:        [x] No significant exanthematous lesions or discoloration noted on facial skin         [] Abnormal-            Psychiatric:       [x] Normal Affect [x] No Hallucinations        [] Abnormal-     Other pertinent observable physical exam findings-     ASSESSMENT/PLAN:   Diagnosis Orders   1. Grief reaction  [employer]      Discussed entire situation with patient she is to remain on the medications I called in we will give her FMLA for the next month and I recommended she get into grief counseling    No follow-ups on file. Destini Knutson is a 39 y.o. female being evaluated by a Virtual Visit (video visit) encounter to address concerns as mentioned above. A caregiver was present when appropriate.  Due to this being a TeleHealth encounter (During [facility]- public health emergency), evaluation of the following organ systems was limited:

## 2021-09-01 ENCOUNTER — CLINICAL DOCUMENTATION (OUTPATIENT)
Dept: PSYCHOLOGY | Age: 46
End: 2021-09-01

## 2021-09-01 ENCOUNTER — TELEPHONE (OUTPATIENT)
Dept: FAMILY MEDICINE CLINIC | Age: 46
End: 2021-09-01

## 2021-09-01 NOTE — LETTER
Pr-14  4.2  [facility] [address] [address]  Phone: [phone]  Fax: [phone]    Rosales Parkinson DO        September 2, 2021     Patient: Hair Wilhelm   YOB: 1975   Date of Visit: 9/1/2021       To Whom it May Concern:    Meredith Mc was seen in my clinic on 8/31/2021. She may return to work on 9/30/2021. If you have any questions or concerns, please don't hesitate to call.     Sincerely,         Marisol Tinsley, DO

## 2021-09-01 NOTE — PROGRESS NOTES
Warm handoff requested by Ashley Nina and was completed. Patient scheduled for future face to face visit. Spoke with patient on the phone for several minutes to introduce her to behavioral health services and to set up an appointment. She reported that she has been experiencing depression due to the sudden death of her 3year-old granddaughter. She reported that she currently lives with her 10 and 6year-old granddaughters. She stated that her entire family has been struggling with this loss. She denied suicidal ideation, plan, or intent.  Scheduled an appointment for Sept 10 at 4pm.

## 2021-09-07 ENCOUNTER — TELEPHONE (OUTPATIENT)
Dept: FAMILY MEDICINE CLINIC | Age: 46
End: 2021-09-07

## 2021-09-10 ENCOUNTER — OFFICE VISIT (OUTPATIENT)
Dept: PSYCHOLOGY | Age: 46
End: 2021-09-10
Payer: COMMERCIAL

## 2021-09-10 DIAGNOSIS — F43.23 ADJUSTMENT DISORDER WITH MIXED ANXIETY AND DEPRESSED MOOD: Primary | ICD-10-CM

## 2021-09-10 PROCEDURE — 90791 PSYCH DIAGNOSTIC EVALUATION: CPT | Performed by: BEHAVIOR ANALYST

## 2021-09-10 NOTE — PROGRESS NOTES
ADULT BEHAVIORAL HEALTH ASSESSMENT  Anton Felipe Psy.D. Licensed Clinical Psychologist [facility] [address] [phone])    Visit Date: 9/10/2021   Time of appointment:  4:00-4:47   Time spent with Patient: 47 minutes. This is patient's first appointment. Reason for Consult:  Depression, Anxiety, and New Patient     Referring Provider/PCP:    No ref. provider found  Tariq Colin DO      Pt provided informed consent for the behavioral health program. Discussed with patient model of service to include the limits of confidentiality (i.e. abuse reporting, suicide intervention, etc.) and short-term intervention focused approach. Pt indicated understanding. PRESENTING PROBLEM AND HISTORY  Rosa Pham is a 39 y.o. female who presents for new evaluation and treatment of anxiety, depression, grief. She reported the following symptoms: depressed mood, anhedonia, decreased appetite, decreased sleep, excessive crying, psychomotor retardation, fatigue/lack of energy, lack of motivation, excessive guilt, mood swings, excessive anxiety and worry about specific stressors and difficulty with attention/concentration. She reported that her mind is constantly racing and she feels like she \"zones out\" constantly. She also described feeling confused and angry due to her grief. Onset of symptoms was approximately a few weeks ago. Symptoms were triggered by the sudden death of her 3year old granddaughter. Her granddaughter was in the care of her mother and accidentally got into fentanyl that apparently her mother's friend had in the home. Patient reported that her death is being ruled as a homicide and is being investigated at this time. Symptoms have been unchanged since that time. She denies current suicidal and homicidal ideation. Family history significant for no psychiatric illness. Risk factors: negative life event death of granddaughter. Previous treatment includes Xanax, Celexa.   She complains of the following medication side effects: none. MENTAL STATUS EXAM  Mood was depressed with congruent and tearful affect. Suicidal ideation was denied. Homicidal ideation was denied. Hygiene was good . Dress was appropriate. Behavior was Within Normal Limits with No observation of difficulties ambulating. Attitude was Cooperative and [place]. Eye-contact was good. Speech: rate - WNL, rhythm -  WNL, volume - WNL  Verbalizations were goal directed and coherent. Thought processes were intact and goal-oriented without evidence of delusions, hallucinations, obsessions, or jamar. Associations were characterized by intact cognitive processes. Pt was oriented to person, place, time, and general circumstances;  recent:  good and remote:  good. Insight and judgment were estimated to be good, AEB, a good  understanding of cyclical maladaptive patterns, and the ability to use insight to inform behavior change. CURRENT MEDICATIONS  Current Outpatient Medications:     citalopram (CELEXA) 20 MG tablet, Take 1 tablet by mouth daily, Disp: 30 tablet, Rfl: 1    ALPRAZolam (XANAX) 0.5 MG tablet, Take 1 tablet by mouth nightly as needed for Sleep for up to 30 days. , Disp: 15 tablet, Rfl: 0    losartan (COZAAR) 50 MG tablet, Take 1 tablet by mouth once daily, Disp: 30 tablet, Rfl: 5    amLODIPine (NORVASC) 10 MG tablet, Take 1 tablet by mouth once daily, Disp: 30 tablet, Rfl: 5    vitamin D (ERGOCALCIFEROL) 1.25 MG (60096 UT) CAPS capsule, Take 1 capsule by mouth once a week for 8 doses, Disp: 8 capsule, Rfl: 0    pantoprazole (PROTONIX) 40 MG tablet, Take 1 tablet by mouth daily, Disp: 30 tablet, Rfl: 5    docusate sodium (COLACE) 100 MG capsule, Take 1 capsule by mouth daily as needed for Constipation, Disp: 30 capsule, Rfl: 5     FAMILY MEDICAL/MH HISTORY   Her family history includes Arthritis in her maternal grandmother and mother; Cancer in her paternal grandmother; Depression in her maternal grandmother; Diabetes in her maternal aunt, maternal cousin, maternal grandmother, and paternal grandmother; Heart Disease in her maternal grandfather and sister; High Blood Pressure in her maternal grandmother and mother; High Cholesterol in her mother; Mental Illness in her maternal grandmother; Stroke in her maternal grandmother; Substance Abuse in her father and maternal uncle. PATIENT MENTAL HEALTH HISTORY  Patient denied a significant history of mental health issues. She denied history of psychotherapy, psychiatric hospitalizations, or suicide attempts. She was recently prescribed Xanax and Celexa to help her get through her current symptoms. She stated that they were initially effective at helping reduce her anxiety, but have not been effective recently. PSYCHOSOCIAL HISTORY  Current living situation: Patient currently lives alone with her 10and 6year-old granddaughters. She has had custody of them for the past year and a half. She has 1 son who lives in the duplex below her. Work/Education: She currently works for an [employer] but is on leave until September 30 due to bereavement. She has been working there for 21 years. Support system: She stated that her boyfriend is her support system, although while she is grieving, reported that she lacks a strong support system. She stated that she feels like she has to be strong for everyone else. DRUG AND ALCOHOL CURRENT USE/HISTORY  TOBACCO:  She reports that she quit smoking about 7 years ago. She has a 5.00 pack-year smoking history. She has never used smokeless tobacco.  ALCOHOL:  She reports no history of alcohol use. OTHER SUBSTANCES: She reports no history of drug use. FLORINDA Wilhelm presented to the appointment today for evaluation and treatment of symptoms of depression/anxiety.   She currently presents with no risk to herself or others and presents with symptoms consistent with a diagnosis of adjustment disorder with mixed anxiety and depression (AEB: Symptoms of anxiety depression due to the sudden death of her granddaughter). Recommend continued medication management with prescribing provider. Pt's symptoms are not well controlled at this time. She will also likely benefit from brief and solution-focused consultation to address cognitive and behavioral interventions for symptoms. Pt was in agreement with recommendations. PHQ Scores 9/8/2021 8/31/2021 1/5/2021 12/1/2020 10/29/2020 8/4/2020 1/20/2020   PHQ2 Score 6 6 0 0 0 0 0   PHQ9 Score 21 6 0 0 0 0 0     Interpretation of Total Score Depression Severity: 1-4 = Minimal depression, 5-9 = Mild depression, 10-14 = Moderate depression, 15-19 = Moderately severe depression, 20-27 = Severe depression    How often pt has had thoughts of death or hurting self (if PHQ positive for depression):       No flowsheet data found. Interpretation of FADUMO-7 score: 5-9 = mild anxiety, 10-14 = moderate anxiety, 15+ = severe anxiety. Recommend referral to behavioral health for scores 10 or greater. DIAGNOSIS  Alonso Sandy was seen today for depression, anxiety and new patient.     Diagnoses and all orders for this visit:    Adjustment disorder with mixed anxiety and depressed mood    INTERVENTION  Discussed self-care (sleep, nutrition, rewarding activities, social support, exercise), Established rapport, Ovid-setting to identify pt's primary goals for [facility] visit / overall health, Supportive techniques, Emphasized self-care as important for managing overall health and Provided Psychoeducation re: grief    PLAN  Follow-up in 2 weeks      Electronically signed by Niels Walker PSYD on 9/10/21 at 3:59 PM EDT

## 2021-09-17 DIAGNOSIS — F43.21 GRIEF REACTION: ICD-10-CM

## 2021-09-17 RX ORDER — ALPRAZOLAM 0.5 MG/1
0.5 TABLET ORAL NIGHTLY PRN
Qty: 15 TABLET | Refills: 0 | Status: SHIPPED | OUTPATIENT
Start: 2021-09-17 | End: 2021-10-04 | Stop reason: SDUPTHER

## 2021-09-17 NOTE — TELEPHONE ENCOUNTER
Nia Saucedo is calling to request a refill on the following medication(s):    Last Visit Date (If Applicable):  3/76/2779    Next Visit Date:    Visit date not found    Medication Request:  Requested Prescriptions     Pending Prescriptions Disp Refills    ALPRAZolam (XANAX) 0.5 MG tablet 15 tablet 0     Sig: Take 1 tablet by mouth nightly as needed for Sleep for up to 30 days.

## 2021-09-24 ENCOUNTER — OFFICE VISIT (OUTPATIENT)
Dept: PSYCHOLOGY | Age: 46
End: 2021-09-24
Payer: COMMERCIAL

## 2021-09-24 DIAGNOSIS — F43.23 ADJUSTMENT DISORDER WITH MIXED ANXIETY AND DEPRESSED MOOD: Primary | ICD-10-CM

## 2021-09-24 PROCEDURE — 90834 PSYTX W PT 45 MINUTES: CPT | Performed by: BEHAVIOR ANALYST

## 2021-09-24 NOTE — PROGRESS NOTES
ADULT BEHAVIORAL HEALTH FOLLOW UP  Dede Calloway Psy.D. Licensed Clinical Psychologist [facility] [address] [id number])      Visit Date: 9/24/2021   Time of appointment:  4:00-4:45   Time spent with Patient: 45 minutes. This is patient's second appointment. Reason for Consult: Follow-up, Anxiety, and Depression     Referring Provider/PCP:    No ref. provider found  Rosales Parkinson DO      Pt provided informed consent for the behavioral health program. Discussed with patient model of service to include the limits of confidentiality (i.e. abuse reporting, suicide intervention, etc.) and short-term intervention focused approach. Pt indicated understanding. Marylu Prather is a 39 y.o. female who presents for follow up of depression, anxiety. She reported medications have been somewhat helpful at reducing her irritability/anxiety. She stated that her symptoms of depression have been unchanged. She stated that she is feeling overwhelmed and continues to have difficulty sleeping and having racing worry thoughts. She reported that her cousin was recently killed, which triggered memories of her granddaughter's death since she went to the emergency room. She also discussed feelings of anger towards family members that were involved in her granddaughter's death, and struggling to accept things that she cannot control. She discussed the investigation and having difficulty waiting to hear results about who will be held accountable for her granddaughter's death, if anyone. She stated that her 2 other granddaughters are keeping her going and she is trying to stick to her routine as much as possible. MENTAL STATUS EXAM  Mood was depressed with congruent and tearful affect. Suicidal ideation was denied. Homicidal ideation was denied. Hygiene was good. Dress was appropriate. Behavior was Within Normal Limits with No observation of difficulties ambulating.    Attitude was Cooperative and Engageable. Eye-contact was good. Speech: rate - WNL, rhythm -  WNL, volume - WNL  Verbalizations were goal directed and coherent. Thought processes were intact and goal-oriented without evidence of delusions, hallucinations, obsessions, or jamar. Associations were characterized by intact cognitive processes. Pt was oriented to person, place, time, and general circumstances;  recent:  good and remote:  good. Insight and judgment were estimated to be good, AEB, a good  understanding of cyclical maladaptive patterns, and the ability to use insight to inform behavior change. ASSESSMENT  Quinton Banks presented to the appointment today for evaluation and treatment of symptoms of depression, anxiety. She is currently considered to be of no risk to herself or others. Pt continues to present with symptoms consistent with a diagnosis of adjustment disorder. Pt will likely continue to benefit from treatment focused on symptoms. Pt was in agreement with recommendations. PHQ Scores 9/8/2021 8/31/2021 1/5/2021 12/1/2020 10/29/2020 8/4/2020 1/20/2020   PHQ2 Score 6 6 0 0 0 0 0   PHQ9 Score 21 6 0 0 0 0 0     Interpretation of Total Score Depression Severity: 1-4 = Minimal depression, 5-9 = Mild depression, 10-14 = Moderate depression, 15-19 = Moderately severe depression, 20-27 = Severe depression    How often pt has had thoughts of death or hurting self (if PHQ positive for depression):       No flowsheet data found. Interpretation of FADUMO-7 score: 5-9 = mild anxiety, 10-14 = moderate anxiety, 15+ = severe anxiety. Recommend referral to behavioral health for scores 10 or greater. DIAGNOSIS  Tomas Hurst was seen today for follow-up, anxiety and depression.     Diagnoses and all orders for this visit:    Adjustment disorder with mixed anxiety and depressed mood    INTERVENTION  Supportive techniques, Emphasized self-care as important for managing overall health, Provided Psychoeducation re: grief and loss, taught relaxation exercise and sent her guided video to practice at home    Pt was engaged throughout the visit and responded well to interventions.      PLAN  Follow-up 3 weeks  Practice relaxation exercise daily      Electronically signed by Sonja Garrison PSYD on 9/24/21 at 3:58 PM EDT

## 2021-10-01 ENCOUNTER — TELEPHONE (OUTPATIENT)
Dept: PSYCHOLOGY | Age: 46
End: 2021-10-01

## 2021-10-01 NOTE — TELEPHONE ENCOUNTER
Patient called to request FMLA paperwork updated by [employer] [facility] because she is not able to get in to see her PCP soon. She is requesting extended FMLA due to adjustment disorder related to the death of her granddaughter. Informed her that a release of information will need to be signed before sending papers to an external person. Her insurance company is going to fax FMLA paperwork. She also requested a letter to send to her employer informing them that she will be out of work due to [employer].

## 2021-10-04 ENCOUNTER — OFFICE VISIT (OUTPATIENT)
Dept: FAMILY MEDICINE CLINIC | Age: 46
End: 2021-10-04
Payer: COMMERCIAL

## 2021-10-04 VITALS
DIASTOLIC BLOOD PRESSURE: 82 MMHG | BODY MASS INDEX: 34.15 KG/M2 | HEART RATE: 89 BPM | RESPIRATION RATE: 16 BRPM | HEIGHT: 64 IN | WEIGHT: 200 LBS | SYSTOLIC BLOOD PRESSURE: 126 MMHG | OXYGEN SATURATION: 100 %

## 2021-10-04 DIAGNOSIS — F43.21 GRIEF REACTION: ICD-10-CM

## 2021-10-04 PROCEDURE — 99213 OFFICE O/P EST LOW 20 MIN: CPT | Performed by: FAMILY MEDICINE

## 2021-10-04 RX ORDER — ALPRAZOLAM 0.5 MG/1
0.5 TABLET ORAL NIGHTLY PRN
Qty: 15 TABLET | Refills: 0 | Status: SHIPPED | OUTPATIENT
Start: 2021-10-04 | End: 2021-11-03

## 2021-10-04 RX ORDER — BUPROPION HYDROCHLORIDE 300 MG/1
300 TABLET ORAL EVERY MORNING
Qty: 30 TABLET | Refills: 11 | Status: SHIPPED | OUTPATIENT
Start: 2021-10-04

## 2021-10-04 NOTE — PROGRESS NOTES
[facility]  [address] [place] [address] [address] [facility] [id number]  Dept: [phone]      Ijeoma Fernandez is a 39 y.o. female who presents today for follow up on her  medical conditions as noted below. Chief Complaint   Patient presents with    Depression       Patient Active Problem List:     Obesity (BMI 30-39. 9)     Dyslipidemia     Chronic lower back pain     Vitamin D deficiency     Essential hypertension     Status post laparoscopic sleeve gastrectomy     History of bilateral breast reduction surgery     Epigastric pain     Past Medical History:   Diagnosis Date    Arthritis     right knee    Congestion of nasal sinus     Diabetes mellitus (HCC)     GERD (gastroesophageal reflux disease)     Hyperlipidemia     Hypertension     Obesity     Sinus congestion     Sleep apnea     C pap machine    Status post laparoscopic sleeve gastrectomy 4-18-16    Dr. Azam Paiz, hosp wt = 224lb, initial wt = 219lb      Past Surgical History:   Procedure Laterality Date    BREAST SURGERY Bilateral 2005    breast reduction    KNEE SURGERY Left     torn ligaments    SLEEVE GASTRECTOMY  4-18-16    with EGD, liver Bx, lap marco a     Family History   Problem Relation Age of Onset    High Cholesterol Mother     Arthritis Mother     High Blood Pressure Mother     Substance Abuse Father     Heart Disease Sister     Diabetes Maternal Aunt     Substance Abuse Maternal Uncle     Arthritis Maternal Grandmother     Depression Maternal Grandmother     Diabetes Maternal Grandmother     High Blood Pressure Maternal Grandmother     Mental Illness Maternal Grandmother     Stroke Maternal Grandmother     Heart Disease Maternal Grandfather     Cancer Paternal Grandmother     Diabetes Paternal Grandmother     Diabetes Maternal Cousin        Current Outpatient Medications   Medication Sig Dispense Refill    ALPRAZolam (XANAX) 0.5 MG tablet Take 1 tablet by mouth nightly as needed for Sleep for up to 30 days. 15 tablet 0    buPROPion (WELLBUTRIN XL) 300 MG extended release tablet Take 1 tablet by mouth every morning 30 tablet 11    citalopram (CELEXA) 20 MG tablet Take 1 tablet by mouth daily 30 tablet 1    losartan (COZAAR) 50 MG tablet Take 1 tablet by mouth once daily 30 tablet 5    amLODIPine (NORVASC) 10 MG tablet Take 1 tablet by mouth once daily 30 tablet 5    vitamin D (ERGOCALCIFEROL) 1.25 MG (55965 UT) CAPS capsule Take 1 capsule by mouth once a week for 8 doses 8 capsule 0    pantoprazole (PROTONIX) 40 MG tablet Take 1 tablet by mouth daily 30 tablet 5    docusate sodium (COLACE) 100 MG capsule Take 1 capsule by mouth daily as needed for Constipation 30 capsule 5     No current facility-administered medications for this visit. ALLERGIES:  No Known Allergies    Social History     Tobacco Use    Smoking status: Former Smoker     Packs/day: 1.00     Years: 5.00     Pack years: 5.00     Quit date: 2014     Years since quittin.5    Smokeless tobacco: Never Used   Substance Use Topics    Alcohol use: No        LDL Calculated (mg/dL)   Date Value   2010 173 (A)     LDL Cholesterol (mg/dL)   Date Value   2021 151 (H)     HDL (mg/dL)   Date Value   2021 52     BUN (mg/dL)   Date Value   2021 10     CREATININE (mg/dL)   Date Value   2021 1.22 (H)     Glucose (mg/dL)   Date Value   2021 84     Hemoglobin A1C (%)   Date Value   2021 5.8     Microalb/Crt.  Ratio (mcg/mg creat)   Date Value   2017 4              Subjective:      HPI  She is being seen today for follow-up on her ongoing issues with grief  She states she is having still a very difficult time trying to function she forgets what she is doing in the middle of driving or going to the store and is just not doing well she is continuing to go to counseling  And is taking Celexa and Xanax    Review of Systems:     Constitutional: Negative for fever, appetite change and fatigue. Family social and medical history reviewed and unchanged     HENT: Negative. Negative for nosebleeds, trouble swallowing and neck pain. Eyes: Negative for photophobia and visual disturbance. Respiratory: Negative. Negative for chest tightness and shortness of breath. Cardiovascular: Negative. Negative for chest pain and leg swelling. Gastrointestinal: Negative. Negative for abdominal pain and blood in stool. Endocrine: Negative for cold intolerance and polyuria. Genitourinary: Negative for dysuria and hematuria. Musculoskeletal: Negative. Skin: Negative for rash. Allergic/Immunologic: Negative. Neurological: Negative. Negative for dizziness, weakness and numbness. Hematological: Negative. Negative for adenopathy. Does not bruise/bleed easily. Psychiatric/Behavioral: Negative for sleep disturbance, dysphoric mood and  decreased concentration. The patient is not nervous/anxious. Objective:     Physical Exam:     Nursing note and vitals reviewed. /82   Pulse 89   Resp 16   Ht 5' 4\" (1.626 m)   Wt 200 lb (90.7 kg)   SpO2 100%   BMI 34.33 kg/m²   Constitutional: She is oriented to person, place, and time. She   appears well-developed and well-nourished. HENT:   Head: Normocephalic and atraumatic. Right Ear: External ear normal. Tympanic membrane is not erythematous. No middle ear effusion. Left Ear: External ear normal. Tympanic membrane is not erythematous. No middle ear effusion. Nose: No mucosal edema. Mouth/Throat: Oropharynx is clear and moist. No posterior oropharyngeal erythema. Eyes: Conjunctivae and EOM are normal. Pupils are equal, round, and reactive to light. Neck: Normal range of motion. Neck supple. No thyromegaly present. Cardiovascular: Normal rate, regular rhythm and normal heart sounds. No murmur heard.   Pulmonary/Chest: Effort normal and breath sounds normal. She has no wheezes. Shehas no rales. Abdominal: Soft. Bowel sounds are normal. She exhibits no distension and no mass. There is no tenderness. There is no rebound and no guarding. Genitourinary/Anorectal:deferred  Musculoskeletal: Normal range of motion. She exhibits no edema or tenderness. Lymphadenopathy: She has no cervical adenopathy. Neurological: She is alert and oriented to person, place, and time. She has normal reflexes. Skin: Skin is warm and dry. No rash noted. Psychiatric: She has a normal mood and flat affect. Her   behavior is normal.       Assessment:      1. Grief reaction          Plan:      Call or return to clinic prn if these symptoms worsen or fail to improve as anticipated. I have reviewed the instructions with the patient, answering all questions to her satisfaction. No follow-ups on file. No orders of the defined types were placed in this encounter. Orders Placed This Encounter   Medications    ALPRAZolam (XANAX) 0.5 MG tablet     Sig: Take 1 tablet by mouth nightly as needed for Sleep for up to 30 days.      Dispense:  15 tablet     Refill:  0    buPROPion (WELLBUTRIN XL) 300 MG extended release tablet     Sig: Take 1 tablet by mouth every morning     Dispense:  30 tablet     Refill:  11   Continue Celexa  Will remain off work until November 1    Electronically signed by Yana Huffman DO on 10/4/2021 at 10:22 AM

## 2021-10-08 ENCOUNTER — TELEPHONE (OUTPATIENT)
Dept: FAMILY MEDICINE CLINIC | Age: 46
End: 2021-10-08

## 2021-10-08 NOTE — TELEPHONE ENCOUNTER
[employer]    Called to requesting more information regarding the patient condition    Ref # [id number]    Phone: [phone]

## 2021-10-13 ENCOUNTER — OFFICE VISIT (OUTPATIENT)
Dept: PSYCHOLOGY | Age: 46
End: 2021-10-13
Payer: COMMERCIAL

## 2021-10-13 DIAGNOSIS — F43.23 ADJUSTMENT DISORDER WITH MIXED ANXIETY AND DEPRESSED MOOD: Primary | ICD-10-CM

## 2021-10-13 PROCEDURE — 90834 PSYTX W PT 45 MINUTES: CPT | Performed by: BEHAVIOR ANALYST

## 2021-10-13 NOTE — PROGRESS NOTES
ADULT BEHAVIORAL HEALTH FOLLOW UP  Matthew Garcia Psy.D. Licensed Clinical Psychologist [facility] [address] [id number])      Visit Date: 10/13/2021   Time of appointment:  4:00-4:44   Time spent with Patient: 44 minutes. This is patient's third appointment. Reason for Consult: Follow-up, Depression, and Anxiety     Referring Provider/PCP:    No ref. provider found  Agueda Flood DO      Pt provided informed consent for the behavioral health program. Discussed with patient model of service to include the limits of confidentiality (i.e. abuse reporting, suicide intervention, etc.) and short-term intervention focused approach. Pt indicated understanding. Gaetano Coronado is a 39 y.o. female who presents for follow up of depression/anxiety. She reported symptoms have been unchanged since her last visit. She discussed feelings of anxiety while she is still waiting for the investigation to be complete. She discussed feeling frustrated that there has not been justice for the death of her granddaughter. She also discussed feelings of anxiety regarding her son's mental health, stating that she fears something bad happening and feels like she needs to constantly check on him. She also discussed her granddaughter's response to the loss, stating that she has regressed behaviorally and has been avoiding school. She reported that this has been hard on her because she has not had much alone time and has felt overwhelmed. Discussed ways to help her family, such as offering support and getting her granddaughter into therapy. She reported that she has been practicing the relaxation exercise that was discussed in her previous visit and it has been helpful. MENTAL STATUS EXAM  Mood was depressed with congruent and tearful affect. Suicidal ideation was denied. Homicidal ideation was denied. Hygiene was good. Dress was appropriate.    Behavior was Within Normal Limits with No observation of difficulties ambulating. Attitude was Cooperative and [place]. Eye-contact was good. Speech: rate - WNL, rhythm -  WNL, volume - WNL  Verbalizations were goal directed and coherent. Thought processes were intact and goal-oriented without evidence of delusions, hallucinations, obsessions, or jamar. Associations were characterized by intact cognitive processes. Pt was oriented to person, place, time, and general circumstances;  recent:  good and remote:  good. Insight and judgment were estimated to be good, AEB, a good  understanding of cyclical maladaptive patterns, and the ability to use insight to inform behavior change. ASSESSMENT  Dorothy Gilbert presented to the appointment today for evaluation and treatment of symptoms of depression, anxiety. She is currently considered to be of no risk to herself or others. Pt continues to present with symptoms consistent with a diagnosis of adjustment disorder. Pt will likely continue to benefit from treatment focused on symptoms. Pt was in agreement with recommendations. PHQ Scores 9/8/2021 8/31/2021 1/5/2021 12/1/2020 10/29/2020 8/4/2020 1/20/2020   PHQ2 Score 6 6 0 0 0 0 0   PHQ9 Score 21 6 0 0 0 0 0     Interpretation of Total Score Depression Severity: 1-4 = Minimal depression, 5-9 = Mild depression, 10-14 = Moderate depression, 15-19 = Moderately severe depression, 20-27 = Severe depression    How often pt has had thoughts of death or hurting self (if PHQ positive for depression):       No flowsheet data found. Interpretation of FADUMO-7 score: 5-9 = mild anxiety, 10-14 = moderate anxiety, 15+ = severe anxiety. Recommend referral to behavioral health for scores 10 or greater. DIAGNOSIS  Teimtope Gu was seen today for follow-up, depression and anxiety.     Diagnoses and all orders for this visit:    Adjustment disorder with mixed anxiety and depressed mood    INTERVENTION  Supportive techniques, Emphasized self-care as important for managing overall health, Provided Psychoeducation re: grief and loss, offered crisis resources that she can utilize to give her son    Pt was engaged throughout the visit and responded well to interventions.      PLAN  Follow-up 2 weeks  Practice relaxation exercise daily      Electronically signed by Cas Hicks PSYD on 10/13/21 at 4:00 PM EDT

## 2021-10-27 ENCOUNTER — OFFICE VISIT (OUTPATIENT)
Dept: PSYCHOLOGY | Age: 46
End: 2021-10-27
Payer: COMMERCIAL

## 2021-10-27 DIAGNOSIS — F43.23 ADJUSTMENT DISORDER WITH MIXED ANXIETY AND DEPRESSED MOOD: Primary | ICD-10-CM

## 2021-10-27 PROCEDURE — 90834 PSYTX W PT 45 MINUTES: CPT | Performed by: BEHAVIOR ANALYST

## 2021-10-27 NOTE — PROGRESS NOTES
ADULT BEHAVIORAL HEALTH FOLLOW UP  Luis Enrique Underwood Psy.D. Licensed Clinical Psychologist [facility] [place] [place] [id number])      Visit Date: 10/27/2021   Time of appointment:  4:05-4:55   Time spent with Patient: 50 minutes. This is patient's fourth appointment. Reason for Consult: Follow-up, Depression, and Anxiety     Referring Provider/PCP:    No ref. provider found  Windy Jaimes DO      Pt provided informed consent for the behavioral health program. Discussed with patient model of service to include the limits of confidentiality (i.e. abuse reporting, suicide intervention, etc.) and short-term intervention focused approach. Pt indicated understanding. Nya Vasquez is a 42 y.o. female who presents for follow up of depression, anxiety. She reported worsening symptoms over the past 2 weeks. She stated that she recently moved, which triggered worsening symptoms. She stated that she has come across a lot of her granddaughter's belongings, which has made it hard for her to adjust to the new apartment and unpacking. She also described feeling like she is leaving behind her granddaughter by starting over at a new apartment. She stated that she had been crying excessively, stating that she feels like she cannot control her emotional outbursts. She described being easily triggered by reminders of her granddaughter. She described feelings of anger with her granddaughter's mother, who has been trying to contact patient and patient's son frequently. She described feeling irritable and wanting to socially isolate, stating that she feels like most people don't understand what she is going through. She stated that she feels like she should have been emotionally stable at this point and \"moved on\" from the loss. She reported that she has been forgetful and feeling like she cannot focus on anything. Discussed her potential return to work.   Also discussed contacting her PCP to discuss medication changes. MENTAL STATUS EXAM  Mood was depressed with congruent and tearful affect. Suicidal ideation was denied. Homicidal ideation was denied. Hygiene was good. Dress was appropriate. Behavior was Within Normal Limits with No observation of difficulties ambulating. Attitude was Cooperative and [place]. Eye-contact was good. Speech: rate - WNL, rhythm -  WNL, volume - WNL  Verbalizations were goal directed and coherent. Thought processes were intact and goal-oriented without evidence of delusions, hallucinations, obsessions, or jamar. Associations were characterized by intact cognitive processes. Pt was oriented to person, place, time, and general circumstances;  recent:  good and remote:  good. Insight and judgment were estimated to be good, AEB, a good  understanding of cyclical maladaptive patterns, and the ability to use insight to inform behavior change. ASSESSMENT  Laura Pandya presented to the appointment today for evaluation and treatment of symptoms of depression, anxiety. She is currently considered to be of no risk to herself or others. Pt continues to present with symptoms consistent with a diagnosis of adjustment disorder. Pt will likely continue to benefit from treatment focused on symptoms. Pt was in agreement with recommendations. PHQ Scores 9/8/2021 8/31/2021 1/5/2021 12/1/2020 10/29/2020 8/4/2020 1/20/2020   PHQ2 Score 6 6 0 0 0 0 0   PHQ9 Score 21 6 0 0 0 0 0     Interpretation of Total Score Depression Severity: 1-4 = Minimal depression, 5-9 = Mild depression, 10-14 = Moderate depression, 15-19 = Moderately severe depression, 20-27 = Severe depression    How often pt has had thoughts of death or hurting self (if PHQ positive for depression):       No flowsheet data found. Interpretation of FADUMO-7 score: 5-9 = mild anxiety, 10-14 = moderate anxiety, 15+ = severe anxiety. Recommend referral to behavioral health for scores 10 or greater.     DIAGNOSIS  Lamar Ramirez was seen today for follow-up, depression and anxiety. Diagnoses and all orders for this visit:    Adjustment disorder with mixed anxiety and depressed mood    INTERVENTION  Supportive techniques, Emphasized self-care as important for managing overall health, Provided Psychoeducation re: grief and loss, CBT focused on maladaptive thoughts    Pt was engaged throughout the visit and responded well to interventions.      PLAN  Follow-up 2 weeks  Practice relaxation exercise daily        Electronically signed by Lula Pena PSYD on 10/27/21 at 4:05 PM EDT

## 2021-10-29 ENCOUNTER — TELEPHONE (OUTPATIENT)
Dept: PSYCHOLOGY | Age: 46
End: 2021-10-29

## 2021-10-29 NOTE — TELEPHONE ENCOUNTER
Spoke with Zac (PAUL in chart) to get paperwork faxed in order to extend patient's leave of absence from work.

## 2021-10-29 NOTE — TELEPHONE ENCOUNTER
Patient called to discuss extending her FMLA due to worsened symptoms (see last visit note). She requested a letter sent to her employer (PAUL in chart) to indicate potential return to work. Also requested provider contact Juan Mcleod to complete paperwork for FMLA.

## 2021-10-29 NOTE — TELEPHONE ENCOUNTER
Received call from Zoraida Khan (PAUL in chart) regarding patient's disability benefits. She confirmed she received a letter indicating extension of leave and she was informed that [facility] paperwork was being faxed as well.

## 2021-11-05 ENCOUNTER — TELEPHONE (OUTPATIENT)
Dept: PSYCHOLOGY | Age: 46
End: 2021-11-05

## 2021-11-05 NOTE — TELEPHONE ENCOUNTER
Patient called to inquire about [facility] paperwork. Informed her that we have not received any paperwork from her insurance or employer. She also rescheduled her appointment to earlier in the day (November 11 at 6 AM).

## 2021-11-10 ENCOUNTER — OFFICE VISIT (OUTPATIENT)
Dept: PSYCHOLOGY | Age: 46
End: 2021-11-10
Payer: COMMERCIAL

## 2021-11-10 DIAGNOSIS — F43.23 ADJUSTMENT DISORDER WITH MIXED ANXIETY AND DEPRESSED MOOD: Primary | ICD-10-CM

## 2021-11-10 PROCEDURE — 90834 PSYTX W PT 45 MINUTES: CPT | Performed by: BEHAVIOR ANALYST

## 2021-11-10 NOTE — PROGRESS NOTES
ADULT BEHAVIORAL HEALTH FOLLOW UP  Joi Narvaez Psy.D. Licensed Clinical Psychologist [facility] [phone])      Visit Date: 11/10/2021   Time of appointment:  11:12-11:51   Time spent with Patient: 39 minutes. This is patient's fifth appointment. Reason for Consult: Follow-up, Depression, and Anxiety     Referring Provider/PCP:    No ref. provider found  Carmela Blake DO      Pt provided informed consent for the behavioral health program. Discussed with patient model of service to include the limits of confidentiality (i.e. abuse reporting, suicide intervention, etc.) and short-term intervention focused approach. Pt indicated understanding. aGb Victoria is a 42 y.o. female who presents for follow up of depression, anxiety. She reported that her granddaughter's headstone was put up yesterday. She described the day as emotional and extremely hard for her. She discussed feelings of anger towards her granddaughter's mother and her family. She also described some conflict within her own family, stating that someone in her family told her to [facility] on\" from the death. She stated that this made her feel angry due to the lack of empathy in the response. She also described feelings of frustration with the ongoing investigation, stating that she is struggling accepting that there has not been justice for her granddaughter's death. She stated that she has lacked a sense of support from friends and family, which has made it harder for her to cope with the loss. MENTAL STATUS EXAM  Mood was depressed with congruent and tearful affect. Suicidal ideation was denied. Homicidal ideation was denied. Hygiene was good. Dress was appropriate. Behavior was Within Normal Limits with No observation of difficulties ambulating. Attitude was Cooperative and [place]. Eye-contact was good.   Speech: rate - WNL, rhythm -  WNL, volume - WNL  Verbalizations were goal directed and coherent. Thought processes were intact and goal-oriented without evidence of delusions, hallucinations, obsessions, or jamar. Associations were characterized by intact cognitive processes. Pt was oriented to person, place, time, and general circumstances;  recent:  good and remote:  good. Insight and judgment were estimated to be good, AEB, a good  understanding of cyclical maladaptive patterns, and the ability to use insight to inform behavior change. ASSESSMENT  Amy Spivey presented to the appointment today for evaluation and treatment of symptoms of depression, anxiety. She is currently considered to be of no risk to herself or others. Pt continues to present with symptoms consistent with a diagnosis of adjustment disorder. Pt will likely continue to benefit from treatment focused on symptoms. Pt was in agreement with recommendations. PHQ Scores 11/3/2021 9/8/2021 8/31/2021 1/5/2021 12/1/2020 10/29/2020 8/4/2020   PHQ2 Score 6 6 6 0 0 0 0   PHQ9 Score 24 21 6 0 0 0 0     Interpretation of Total Score Depression Severity: 1-4 = Minimal depression, 5-9 = Mild depression, 10-14 = Moderate depression, 15-19 = Moderately severe depression, 20-27 = Severe depression    How often pt has had thoughts of death or hurting self (if PHQ positive for depression):       No flowsheet data found. Interpretation of FADUMO-7 score: 5-9 = mild anxiety, 10-14 = moderate anxiety, 15+ = severe anxiety. Recommend referral to behavioral health for scores 10 or greater. DIAGNOSIS  Julieth Al was seen today for follow-up, depression and anxiety.     Diagnoses and all orders for this visit:    Adjustment disorder with mixed anxiety and depressed mood    INTERVENTION  Supportive techniques, Emphasized self-care as important for managing overall health, Provided Psychoeducation re: grief and loss, CBT focused on maladaptive thoughts, coping with anger    Pt was engaged throughout the visit and responded well to

## 2021-11-16 DIAGNOSIS — K59.00 CONSTIPATION, UNSPECIFIED CONSTIPATION TYPE: ICD-10-CM

## 2021-11-17 ENCOUNTER — OFFICE VISIT (OUTPATIENT)
Dept: PSYCHOLOGY | Age: 46
End: 2021-11-17
Payer: COMMERCIAL

## 2021-11-17 DIAGNOSIS — F43.23 ADJUSTMENT DISORDER WITH MIXED ANXIETY AND DEPRESSED MOOD: Primary | ICD-10-CM

## 2021-11-17 PROCEDURE — 90834 PSYTX W PT 45 MINUTES: CPT | Performed by: BEHAVIOR ANALYST

## 2021-11-17 RX ORDER — DOCUSATE SODIUM 100 MG/1
100 CAPSULE, LIQUID FILLED ORAL DAILY PRN
Qty: 30 CAPSULE | Refills: 5 | Status: SHIPPED | OUTPATIENT
Start: 2021-11-17

## 2021-11-17 NOTE — PROGRESS NOTES
ADULT BEHAVIORAL HEALTH FOLLOW UP  Anabella Paredes Psy.D. Licensed Clinical Psychologist [facility] [address] [phone])      Visit Date: 11/17/2021   Time of appointment:  3:00-3:45   Time spent with Patient: 45 minutes. This is patient's sixth appointment. Reason for Consult: Follow-up, Depression, and Anxiety     Referring Provider/PCP:    No ref. provider found  Dario Syed DO      Pt provided informed consent for the behavioral health program. Discussed with patient model of service to include the limits of confidentiality (i.e. abuse reporting, suicide intervention, etc.) and short-term intervention focused approach. Pt indicated understanding. Blaine Fabian is a 42 y.o. female who presents for follow up of depression, anxiety. She reported that she has been feeling frustrated due to the case of her granddaughter's death not being complete. She reported feeling angry that justice has not been served and frustrated with the legal system for taking months on the case. She also discussed her granddaughter's mother's behavior, stating that she has been calling [place] repeatedly from different numbers, despite [place] blocking her and telling her not to call. She reported feelings of anxiety constantly because of this. She reported having a hard time moving on or accepting her granddaughter's death because of the lack of justice and the constant contact with the baby's mother. Discussed ways to utilize resources as well as social support to manage anxiety (I.e., asking questions of the  to ease expectations, practicing relaxation techniques). MENTAL STATUS EXAM  Mood was depressed with congruent affect. Suicidal ideation was denied. Homicidal ideation was denied. Hygiene was good. Dress was appropriate. Behavior was Within Normal Limits with No observation of difficulties ambulating. Attitude was Cooperative and [place]. Eye-contact was good.   Speech: rate - WNL, rhythm -  WNL, volume - WNL  Verbalizations were goal directed and coherent. Thought processes were intact and goal-oriented without evidence of delusions, hallucinations, obsessions, or jamar. Associations were characterized by intact cognitive processes. Pt was oriented to person, place, time, and general circumstances;  recent:  good and remote:  good. Insight and judgment were estimated to be good, AEB, a good  understanding of cyclical maladaptive patterns, and the ability to use insight to inform behavior change. ASSESSMENT  Audrey Massey presented to the appointment today for evaluation and treatment of symptoms of depression, anxiety. She is currently considered to be of no risk to herself or others. Pt continues to present with symptoms consistent with a diagnosis of adjustment disorder. Pt will likely continue to benefit from treatment focused on symptoms. This [facility] is leaving clinical practice within [address] and the patient agreed to continue being seen by Ariela Mendosa. A follow-up was scheduled with Javier Hollingsworth on 12/9/21. Pt was in agreement with recommendations. PHQ Scores 11/3/2021 9/8/2021 8/31/2021 1/5/2021 12/1/2020 10/29/2020 8/4/2020   PHQ2 Score 6 6 6 0 0 0 0   PHQ9 Score 24 21 6 0 0 0 0     Interpretation of Total Score Depression Severity: 1-4 = Minimal depression, 5-9 = Mild depression, 10-14 = Moderate depression, 15-19 = Moderately severe depression, 20-27 = Severe depression    How often pt has had thoughts of death or hurting self (if PHQ positive for depression):       No flowsheet data found. Interpretation of FADUMO-7 score: 5-9 = mild anxiety, 10-14 = moderate anxiety, 15+ = severe anxiety. Recommend referral to behavioral health for scores 10 or greater. DIAGNOSIS  Stefan Dave was seen today for follow-up, depression and anxiety.     Diagnoses and all orders for this visit:    Adjustment disorder with mixed anxiety and depressed mood    INTERVENTION  Supportive techniques, Emphasized self-care as important for managing overall health, Provided Psychoeducation re: grief and loss, CBT focused on maladaptive thoughts, coping with anxiety    Pt was engaged throughout the visit and responded well to interventions.      PLAN  Follow-up 3 weeks with Gregoria Peter      Electronically signed by Vivek Edmondson PSYD on 11/17/21 at 3:00 PM EST

## 2021-11-18 ENCOUNTER — OFFICE VISIT (OUTPATIENT)
Dept: BARIATRICS/WEIGHT MGMT | Age: 46
End: 2021-11-18
Payer: COMMERCIAL

## 2021-11-18 VITALS
WEIGHT: 204 LBS | HEART RATE: 80 BPM | HEIGHT: 64 IN | BODY MASS INDEX: 34.83 KG/M2 | DIASTOLIC BLOOD PRESSURE: 88 MMHG | SYSTOLIC BLOOD PRESSURE: 130 MMHG

## 2021-11-18 DIAGNOSIS — I10 ESSENTIAL HYPERTENSION: ICD-10-CM

## 2021-11-18 DIAGNOSIS — K21.9 GASTROESOPHAGEAL REFLUX DISEASE WITHOUT ESOPHAGITIS: Primary | ICD-10-CM

## 2021-11-18 DIAGNOSIS — E66.01 MORBID OBESITY DUE TO EXCESS CALORIES (HCC): ICD-10-CM

## 2021-11-18 PROCEDURE — 99213 OFFICE O/P EST LOW 20 MIN: CPT | Performed by: SURGERY

## 2021-11-19 ENCOUNTER — TELEPHONE (OUTPATIENT)
Dept: BARIATRICS/WEIGHT MGMT | Age: 46
End: 2021-11-19

## 2021-11-19 NOTE — TELEPHONE ENCOUNTER
Spoke with patient and gave her the number to call for \"surgery plus\". Patient states she will reach out to the number and follow with them.

## 2021-11-19 NOTE — TELEPHONE ENCOUNTER
Called and spoke with Maria D Higgins at [facility]  - she states patient has a bariatric surgery benefit but needs to go through [employer] and spoke with Chidi Lennon at [phone] \"Surgery Plus\"  She states the patient does have a bariatric surgery benefit but needs to stay in the \"surgery plus\" network. Per representative Dr. Storm Dean or [facility] is in network. Patient needs to call [phone] and speak with a representative for further instructions.

## 2021-11-21 NOTE — PROGRESS NOTES
Dysphagia   [] Melena   [] BRBPR  [x] Vomiting   [x] Abdominal Pain   [] Diarrhea     [] Constipation  [] Other:     Positive for: [x] Heartburn   [x] Reflux   [] Dysphagia   [] Melena   [] BRBPR  [] Vomiting   [] Abdominal Pain   [] Diarrhea     [] Constipation  [] Other:    Muskuloskeletal Negative for: [] Joint pain   [] Back pain   [] Knee Pain   [x] Muscle weakness [x] Hernia   [] Edema [] Other:     Positive for: [] Joint pain   [] Back pain   [] Knee Pain   [] Muscle weakness [] Hernia   [] Edema [] Other:    Neurologic Negative for: [x] Syncope   [x] Insomnia   [] Being treated for depression  [] Other:     Positive for: [] Syncope   [] Insomnia   [] Being treated for depression  [] Other:    Skin Negative for: [] Wound:   [] Open   [] Draining   [] Incisional     [x] Rash   [x] Hair Loss  [] Other:     Positive for: [] Wound:   [] Open   [] Draining    [] Incisional  [] Rash   [] Hair Loss  [] Other:            Physical Exam:  /88 (Site: Right Upper Arm, Position: Sitting, Cuff Size: Large Adult)   Pulse 80   Ht 5' 4\" (1.626 m)   Wt 204 lb (92.5 kg)   BMI 35.02 kg/m²   Constitutional:  Vital signs are normal. The patient appears well-developed and well-nourished. HEENT:   Head: Normocephalic. Atraumatic  Eyes: pupils are equal and reactive. No scleral icterus is present. Neck: No mass and no thyromegaly present. Cardiovascular: Normal rate, regular rhythm, S1 normal and S2 normal.  Radial pulses present   Pulmonary/Chest: Effort normal and breath sounds normal. No retractions  Abdominal: Soft. Normal appearance. There is no organomegaly. No tenderness. There is no rigidity, no rebound, no guarding and no Landaverde's sign. Musculoskeletal:        Right lower leg: Normal. No tenderness and no edema. Left lower leg: Normal. No tenderness and no edema. Lymphadenopathy:     No cervical adenopathy, No Exrtemity Adenopathy. Neurological: The patient is alert and oriented.  Moving all 4 extremities, sensation grossly intact bilateral  Skin: Skin is warm, dry and intact. Psychiatric: The patient has a normal mood and affect. Speech is normal and behavior is normal. Judgment and thought content normal. Cognition and memory are normal.       Assessment & Plan:      1. Gastroesophageal reflux disease without esophagitis    2. Essential hypertension    3. Morbid obesity due to excess calories (HCC)            [] Actigal: [] NA   [x] S/p Cholecystectomy  [] Continue 3 months post-op  [x] Finished    [x] Advance Diet    [x] Daily protein (65-75gm/day)   [x] Take Vitamins   [x] Attend Support Group    [x] Exercise Regularly     [x] Use contraception:    [] NA    [] s/p Hysterectomy   [] s/p Tubal ligation   [] Other:     Essential hypertension  Continue to monitor blood pressures  Weight loss discussed  Will need medical clearance prior to revision    GERD without esophagitis  EGD  Given persistent GERD bypass was offered to patient  Continue PPI    Morbid Obesity, BMI 35  We discussed revision surgery to improve her GERD and assist with weight loss  Psych evaluation to rule out depression or eating disorder  Bariatric vitamins discused  We discussed surgical options for revision. Given GERD bypass is probably best     Follow up: No follow-ups on file. Orders placed this encounter:   No orders of the defined types were placed in this encounter. New Prescriptions:   No orders of the defined types were placed in this encounter. Electronically signed by Rambo Myers DO on 11/21/2021 at 1:07 PM    Please note that this chart was generated using voice recognition Dragon dictation software. Although every effort was made to ensure the accuracy of this automated transcription, some errors in transcription may have occurred.

## 2021-12-01 ENCOUNTER — TELEPHONE (OUTPATIENT)
Dept: PRIMARY CARE CLINIC | Age: 46
End: 2021-12-01

## 2021-12-09 ENCOUNTER — OFFICE VISIT (OUTPATIENT)
Dept: PSYCHOLOGY | Age: 46
End: 2021-12-09
Payer: COMMERCIAL

## 2021-12-09 DIAGNOSIS — F43.23 ADJUSTMENT DISORDER WITH MIXED ANXIETY AND DEPRESSED MOOD: Primary | ICD-10-CM

## 2021-12-09 PROCEDURE — 90837 PSYTX W PT 60 MINUTES: CPT | Performed by: PSYCHOLOGIST

## 2021-12-09 NOTE — PROGRESS NOTES
ambulating. Attitude was Cooperative and [place]. Eye-contact was good. Speech: rate - WNL, rhythm -  WNL, volume - WNL  Verbalizations were goal directed and coherent. Thought processes were intact and goal-oriented without evidence of delusions, hallucinations, obsessions, or jamar. Associations were characterized by intact cognitive processes. Pt was oriented to person, place, time, and general circumstances;  recent:  good and remote:  good. Insight and judgment were estimated to be good, AEB, a good  understanding of cyclical maladaptive patterns, and the ability to use insight to inform behavior change. ASSESSMENT  Esther Sanches presented to the appointment today for evaluation and treatment of symptoms of depression, anxiety. She is currently considered to be of no risk to herself or others. Pt continues to present with symptoms consistent with a diagnosis of adjustment disorder. Pt will likely continue to benefit from treatment focused on symptoms. Pt was in agreement with recommendations. PHQ Scores 11/3/2021 9/8/2021 8/31/2021 1/5/2021 12/1/2020 10/29/2020 8/4/2020   PHQ2 Score 6 6 6 0 0 0 0   PHQ9 Score 24 21 6 0 0 0 0     Interpretation of Total Score Depression Severity: 1-4 = Minimal depression, 5-9 = Mild depression, 10-14 = Moderate depression, 15-19 = Moderately severe depression, 20-27 = Severe depression    How often pt has had thoughts of death or hurting self (if PHQ positive for depression):       No flowsheet data found. Interpretation of FADUMO-7 score: 5-9 = mild anxiety, 10-14 = moderate anxiety, 15+ = severe anxiety. Recommend referral to behavioral health for scores 10 or greater. DIAGNOSIS  Barbara Collins was seen today for follow-up, depression and anxiety.     Diagnoses and all orders for this visit:    Adjustment disorder with mixed anxiety and depressed mood    INTERVENTION  Supportive techniques, Emphasized self-care as important for managing overall health, Provided Psychoeducation re: grief and loss, Emphasized importance of relaxation techniques, Explained and practiced mindful body scan    Pt was engaged throughout the visit and responded well to interventions. PLAN  Follow up in 4 weeks.       Electronically signed by Tu Joshi on 12/9/21 at 6:36 PM EST

## 2021-12-17 ENCOUNTER — OFFICE VISIT (OUTPATIENT)
Dept: FAMILY MEDICINE CLINIC | Age: 46
End: 2021-12-17
Payer: COMMERCIAL

## 2021-12-17 VITALS
DIASTOLIC BLOOD PRESSURE: 86 MMHG | BODY MASS INDEX: 35.17 KG/M2 | SYSTOLIC BLOOD PRESSURE: 138 MMHG | WEIGHT: 206 LBS | HEIGHT: 64 IN | OXYGEN SATURATION: 98 % | HEART RATE: 81 BPM

## 2021-12-17 DIAGNOSIS — F43.21 GRIEF REACTION: Primary | ICD-10-CM

## 2021-12-17 PROCEDURE — 99213 OFFICE O/P EST LOW 20 MIN: CPT | Performed by: FAMILY MEDICINE

## 2021-12-17 NOTE — LETTER
[facility] [address] [address]  Phone: [phone]  Fax: [phone]    Valdo Warren DO        December 17, 2021     Patient: Winnie Storey   YOB: 1975   Date of Visit: 12/17/2021       To Whom it May Concern:    Lucio Hodgkin was seen in my clinic on 12/17/2021. She may return to work on 1/17/2022. If you have any questions or concerns, please don't hesitate to call.     Sincerely,         Marisol Tinsley, DO

## 2021-12-17 NOTE — PROGRESS NOTES
[facility]  [address] [place] [address] [address] [address]  Dept: [phone]      Kanchan Hernandez is a 42 y.o. female who presents today for follow up on her  medical conditions as noted below. Chief Complaint   Patient presents with    Other     FMLA        Patient Active Problem List:     Obesity (BMI 30-39. 9)     Dyslipidemia     Chronic lower back pain     Vitamin D deficiency     Essential hypertension     Status post laparoscopic sleeve gastrectomy     History of bilateral breast reduction surgery     Epigastric pain     Past Medical History:   Diagnosis Date    Arthritis     right knee    Congestion of nasal sinus     Diabetes mellitus (HCC)     GERD (gastroesophageal reflux disease)     Hyperlipidemia     Hypertension     Obesity     Sinus congestion     Sleep apnea     C pap machine    Status post laparoscopic sleeve gastrectomy 4-18-16    Dr. Javy Traore, hosp wt = 224lb, initial wt = 219lb      Past Surgical History:   Procedure Laterality Date    BREAST SURGERY Bilateral 2005    breast reduction    KNEE SURGERY Left     torn ligaments    SLEEVE GASTRECTOMY  4-18-16    with EGD, liver Bx, lap marco a     Family History   Problem Relation Age of Onset    High Cholesterol Mother     Arthritis Mother     High Blood Pressure Mother     Substance Abuse Father     Heart Disease Sister     Diabetes Maternal Aunt     Substance Abuse Maternal Uncle     Arthritis Maternal Grandmother     Depression Maternal Grandmother     Diabetes Maternal Grandmother     High Blood Pressure Maternal Grandmother     Mental Illness Maternal Grandmother     Stroke Maternal Grandmother     Heart Disease Maternal Grandfather     Cancer Paternal Grandmother     Diabetes Paternal Grandmother     Diabetes Maternal Cousin        Current Outpatient Medications   Medication Sig Dispense Refill    docusate sodium (COLACE) 100 MG capsule Take 1 capsule by mouth daily as needed for Constipation 30 capsule 5    citalopram (CELEXA) 20 MG tablet Take 1 tablet by mouth daily 90 tablet 3    buPROPion (WELLBUTRIN XL) 300 MG extended release tablet Take 1 tablet by mouth every morning 30 tablet 11    losartan (COZAAR) 50 MG tablet Take 1 tablet by mouth once daily 30 tablet 5    amLODIPine (NORVASC) 10 MG tablet Take 1 tablet by mouth once daily 30 tablet 5    pantoprazole (PROTONIX) 40 MG tablet Take 1 tablet by mouth daily 30 tablet 5     No current facility-administered medications for this visit. ALLERGIES:  No Known Allergies    Social History     Tobacco Use    Smoking status: Former Smoker     Packs/day: 1.00     Years: 5.00     Pack years: 5.00     Quit date: 2014     Years since quittin.7    Smokeless tobacco: Never Used   Substance Use Topics    Alcohol use: No        LDL Calculated (mg/dL)   Date Value   2010 173 (A)     LDL Cholesterol (mg/dL)   Date Value   2021 151 (H)     HDL (mg/dL)   Date Value   2021 52     BUN (mg/dL)   Date Value   2021 10     CREATININE (mg/dL)   Date Value   2021 1.22 (H)     Glucose (mg/dL)   Date Value   2021 84     Hemoglobin A1C (%)   Date Value   2021 5.8     Microalb/Crt. Ratio (mcg/mg creat)   Date Value   2017 4              Subjective:      HPI  He is being seen today for follow-up on ongoing issues with prolonged grief her granddaughter had passed away from a tragic incident  And now just the other weekend her son the father of the child overdosed he is not a chronic drug user luckily he survived  She just is not able to function at work she tried to return and couldn't focus and was miserable and feels as though she needs to remain off she is actively going to counseling and taking medications    Review of Systems:     Constitutional: Negative for fever, appetite change and fatigue.         Family social and medical history reviewed and unchanged     HENT: Negative. Negative for nosebleeds, trouble swallowing and neck pain. Eyes: Negative for photophobia and visual disturbance. Respiratory: Negative. Negative for chest tightness and shortness of breath. Cardiovascular: Negative. Negative for chest pain and leg swelling. Gastrointestinal: Negative. Negative for abdominal pain and blood in stool. Endocrine: Negative for cold intolerance and polyuria. Genitourinary: Negative for dysuria and hematuria. Musculoskeletal: Negative. Skin: Negative for rash. Allergic/Immunologic: Negative. Neurological: Negative. Negative for dizziness, weakness and numbness. Hematological: Negative. Negative for adenopathy. Does not bruise/bleed easily. Psychiatric/Behavioral: Negative for sleep disturbance, dysphoric mood and  decreased concentration. The patient is not nervous/anxious. Objective:     Physical Exam:     Nursing note and vitals reviewed. /86   Pulse 81   Ht 5' 4\" (1.626 m)   Wt 206 lb (93.4 kg)   SpO2 98%   BMI 35.36 kg/m²   Constitutional: She is oriented to person, place, and time. She   appears well-developed and well-nourished. HENT:   Head: Normocephalic and atraumatic. Right Ear: External ear normal. Tympanic membrane is not erythematous. No middle ear effusion. Left Ear: External ear normal. Tympanic membrane is not erythematous. No middle ear effusion. Nose: No mucosal edema. Mouth/Throat: Oropharynx is clear and moist. No posterior oropharyngeal erythema. Eyes: Conjunctivae and EOM are normal. Pupils are equal, round, and reactive to light. Neck: Normal range of motion. Neck supple. No thyromegaly present. Cardiovascular: Normal rate, regular rhythm and normal heart sounds. No murmur heard. Pulmonary/Chest: Effort normal and breath sounds normal. She has no wheezes. Shehas no rales. Abdominal: Soft. Bowel sounds are normal. She exhibits no distension and no mass. There is no tenderness. There is no rebound and no guarding. Genitourinary/Anorectal:deferred  Musculoskeletal: Normal range of motion. She exhibits no edema or tenderness. Lymphadenopathy: She has no cervical adenopathy. Neurological: She is alert and oriented to person, place, and time. She has normal reflexes. Skin: Skin is warm and dry. No rash noted. Psychiatric: She has a depressed  mood and affect. Her   behavior is flat        Assessment:      1. Grief reaction          Plan:      Call or return to clinic prn if these symptoms worsen or fail to improve as anticipated. I have reviewed the instructions with the patient, answering all questions to her satisfaction. No follow-ups on file. No orders of the defined types were placed in this encounter. No orders of the defined types were placed in this encounter.     Will continue work leave for the next month returning Jan 17  Electronically signed by Artem Escalante DO on 12/17/2021 at 10:39 AM

## 2022-01-04 ENCOUNTER — TELEPHONE (OUTPATIENT)
Age: 47
End: 2022-01-04

## 2022-01-04 NOTE — TELEPHONE ENCOUNTER
Spoke with patient regarding job duties and return to work date to [employer] paperwork.

## 2022-01-11 ENCOUNTER — OFFICE VISIT (OUTPATIENT)
Age: 47
End: 2022-01-11
Payer: COMMERCIAL

## 2022-01-11 DIAGNOSIS — F43.23 ADJUSTMENT DISORDER WITH MIXED ANXIETY AND DEPRESSED MOOD: Primary | ICD-10-CM

## 2022-01-11 PROCEDURE — 90837 PSYTX W PT 60 MINUTES: CPT | Performed by: PSYCHOLOGIST

## 2022-01-11 NOTE — PROGRESS NOTES
Giovanny Demarco M.A. Psychology Doctoral Trainee    Supervising Clinical Psychologists:  Lucinda Montgomery, Ph.D. Alfredo Allen,  Ph.D. Roxana Jacobo [phone]      Visit Date: 1/11/2022   Time of appointment:  4:02-4:55 pm  Time spent with Patient: 53 minutes. This is patient's eighth appointment. Reason for Consult: Follow-up, Depression, and Anxiety     Referring Provider/PCP:    No ref. provider found  Xiang Bolton DO      Pt provided informed consent for the behavioral health program. Discussed with patient model of service to include the limits of confidentiality (i.e. abuse reporting, suicide intervention, etc.) and short-term intervention focused approach. Also discussed with patient that the service provider is a supervised clinician and is being supervised by Dr. Mars Morataya or Dr. Kishor Oconnell. Pt indicated understanding. Bharat Freitas is a 42 y.o. female who presents for follow up of depression and anxiety. She reported her son attempted suicide on 12/15/21. She stated she has experienced increased anxiety and stress regarding her son's well-being since then. She reported she has taken some time off of work and feels overwhelmed with stress. Emphasized that pt has been through two traumatic events in the past several months and validated her emotions. Explained and practice mindful body scan. Encouraged pt to take time to care for herself to prevent burnout. Previous Recommendations: Follow up in 4 weeks. MENTAL STATUS EXAM  Mood was depressed with congruent affect. Suicidal ideation was denied. Homicidal ideation was denied. Hygiene was good. Dress was appropriate. Behavior was Within Normal Limits with No observation of difficulties ambulating. Attitude was Cooperative and [place]. Eye-contact was good. Speech: rate - WNL, rhythm -  WNL, volume - WNL  Verbalizations were goal directed and coherent.   Thought processes were intact and goal-oriented without evidence of delusions, hallucinations, obsessions, or jamar. Associations were characterized by intact cognitive processes. Pt was oriented to person, place, time, and general circumstances;  recent:  good and remote:  good. Insight and judgment were estimated to be good, AEB, a good  understanding of cyclical maladaptive patterns, and the ability to use insight to inform behavior change. ASSESSMENT  Ashley Elena presented to the appointment today for evaluation and treatment of symptoms of depression, anxiety. She is currently considered to be of no risk to herself or others. Pt continues to present with symptoms consistent with a diagnosis of adjustment disorder. Pt will likely continue to benefit from treatment focused on symptoms. Pt was in agreement with recommendations. PHQ Scores 11/30/2021 11/30/2021 11/3/2021 9/8/2021 8/31/2021 1/5/2021 12/1/2020   PHQ2 Score 6 6 6 6 6 0 0   PHQ9 Score 24 24 24 21 6 0 0     Interpretation of Total Score Depression Severity: 1-4 = Minimal depression, 5-9 = Mild depression, 10-14 = Moderate depression, 15-19 = Moderately severe depression, 20-27 = Severe depression    How often pt has had thoughts of death or hurting self (if PHQ positive for depression):       FADUMO 7 SCORE 11/30/2021   FADUMO-7 Total Score 21     Interpretation of FADUMO-7 score: 5-9 = mild anxiety, 10-14 = moderate anxiety, 15+ = severe anxiety. Recommend referral to behavioral health for scores 10 or greater. DIAGNOSIS  Juan Hwang was seen today for follow-up, depression and anxiety.     Diagnoses and all orders for this visit:    Adjustment disorder with mixed anxiety and depressed mood          INTERVENTION  Discussed self-care (sleep, nutrition, rewarding activities, social support, exercise), Established rapport, Supportive techniques, Emphasized self-care as important for managing overall health, Explained mindfulness technique in detail and practiced this with pt in visit and Emphasized importance of regular practice of relaxation strategies to target anxiety symptoms and promote awareness/focus on present moment    Pt was engaged throughout the visit and responded well to interventions. PLAN  Follow up in 1 week.       Electronically signed by Zac Steel on 1/11/22 at 5:00 PM EST

## 2022-01-17 ENCOUNTER — TELEPHONE (OUTPATIENT)
Dept: FAMILY MEDICINE CLINIC | Age: 47
End: 2022-01-17

## 2022-01-17 NOTE — TELEPHONE ENCOUNTER
Pt request a letter to release to Pt to return to work 1/19/22.   Wants letter faxed to:     [phone] (C)

## 2022-01-17 NOTE — LETTER
[address]  [place] [address] [address]  Phone: [phone]  Fax: [phone]    Brunilda Agosto,         January 17, 2022      To Whom It May Concern:    Carlito Dixon may return to work on 1/19/2022. If you have questions or concerns please contact my office.        Sincerely,        Marisol Tinsley, DO

## 2022-01-20 ENCOUNTER — OFFICE VISIT (OUTPATIENT)
Age: 47
End: 2022-01-20
Payer: COMMERCIAL

## 2022-01-20 DIAGNOSIS — F43.23 ADJUSTMENT DISORDER WITH MIXED ANXIETY AND DEPRESSED MOOD: Primary | ICD-10-CM

## 2022-01-20 PROCEDURE — 90834 PSYTX W PT 45 MINUTES: CPT | Performed by: PSYCHOLOGIST

## 2022-01-21 NOTE — PROGRESS NOTES
Agnieszka Junior M.A. Psychology Doctoral Trainee    Supervising Clinical Psychologists:  Trudi Duque, Ph.D. Avril Zimmer,  Ph.D. Myriam Del Castillo [id number]      Visit Date: 1/20/2022   Time of appointment:  4:00-4:50 pm  Time spent with Patient: 50 minutes. This is patient's ninth appointment. Reason for Consult: Follow-up, Depression, and Anxiety     Referring Provider/PCP:    No ref. provider found  Monica Tuttle DO      Pt provided informed consent for the behavioral health program. Discussed with patient model of service to include the limits of confidentiality (i.e. abuse reporting, suicide intervention, etc.) and short-term intervention focused approach. Also discussed with patient that the service provider is a supervised clinician and is being supervised by Dr. Nelsy Saenz or Dr. Pia Marte. Pt indicated understanding. Afshan Kathleen is a 42 y.o. female who presents for follow up of depression and anxiety. She reported she returned to work on Tuesday after being out for a few weeks. She stated work has been [place]" but reported another employee gave her gifts for her grandchildren including a gift for her granddaughter who passed away in August 2021. Dominguez Smiley stated she did not know how to respond to receiving the gift for her granddaughter and does not know \"what to do with the gift. \" She stated it has been on her mind \"a lot\" because the gift is a reminder that her granddaughter is not here. Discussed all her options with what she could do with the gift. Explored different and creative approaches regarding what she could do with the gift (e.g., give it to her dog, step on it, throw it away). Encouraged pt to \"play around\" with the options discussed and come to a decision on what she would like to do with the gift and how it might be helpful in her healing process. Previous Recommendations: Follow up in 1 week.     MENTAL STATUS EXAM  Mood was depressed with congruent affect. Suicidal ideation was denied. Homicidal ideation was denied. Hygiene was good. Dress was appropriate. Behavior was Within Normal Limits with No observation of difficulties ambulating. Attitude was Cooperative and [place]. Eye-contact was good. Speech: rate - WNL, rhythm -  WNL, volume - WNL  Verbalizations were goal directed and coherent. Thought processes were intact and goal-oriented without evidence of delusions, hallucinations, obsessions, or jamar. Associations were characterized by intact cognitive processes. Pt was oriented to person, place, time, and general circumstances;  recent:  good and remote:  good. Insight and judgment were estimated to be good, AEB, a good  understanding of cyclical maladaptive patterns, and the ability to use insight to inform behavior change. ASSESSMENT  Laura Garcia presented to the appointment today for evaluation and treatment of symptoms of depression, anxiety. She is currently considered to be of no risk to herself or others. Pt continues to present with symptoms consistent with a diagnosis of adjustment disorder. Pt will likely continue to benefit from treatment focused on symptoms. Pt was in agreement with recommendations. PHQ Scores 11/30/2021 11/30/2021 11/3/2021 9/8/2021 8/31/2021 1/5/2021 12/1/2020   PHQ2 Score 6 6 6 6 6 0 0   PHQ9 Score 24 24 24 21 6 0 0     Interpretation of Total Score Depression Severity: 1-4 = Minimal depression, 5-9 = Mild depression, 10-14 = Moderate depression, 15-19 = Moderately severe depression, 20-27 = Severe depression    How often pt has had thoughts of death or hurting self (if PHQ positive for depression):       FADUMO 7 SCORE 11/30/2021   FADUMO-7 Total Score 21     Interpretation of FADUMO-7 score: 5-9 = mild anxiety, 10-14 = moderate anxiety, 15+ = severe anxiety. Recommend referral to behavioral health for scores 10 or greater.       DIAGNOSIS  Logan Bourgeois was seen today for follow-up, depression and anxiety. Diagnoses and all orders for this visit:    Adjustment disorder with mixed anxiety and depressed mood          INTERVENTION  Established rapport, Conducted functional assessment, Supportive techniques, Cognitive strategies to target cognitive fusion including brainstorming options and Problem-solving re: what she can do with gift for granddaughter    Pt was engaged throughout the visit and responded well to interventions.      PLAN  1) Follow up in 2 weeks  2) Pt will decide what to do with the gift for granddaughter    Electronically signed by Mesha Hdz on 1/21/22 at 2:01 PM EST

## 2022-02-01 ENCOUNTER — TELEPHONE (OUTPATIENT)
Age: 47
End: 2022-02-01

## 2022-02-01 NOTE — TELEPHONE ENCOUNTER
Spoke with patient regarding switching Thursday's [facility] appointment to virtual due to inclement weather. Pt confirmed virtual appointment was okay.

## 2022-02-03 ENCOUNTER — VIRTUAL VISIT (OUTPATIENT)
Age: 47
End: 2022-02-03
Payer: COMMERCIAL

## 2022-02-03 DIAGNOSIS — F43.23 ADJUSTMENT DISORDER WITH MIXED ANXIETY AND DEPRESSED MOOD: Primary | ICD-10-CM

## 2022-02-03 PROCEDURE — 90834 PSYTX W PT 45 MINUTES: CPT | Performed by: PSYCHOLOGIST

## 2022-02-03 NOTE — PROGRESS NOTES
Chantal iPke M.A. Psychology Doctoral Trainee    Supervising Clinical Psychologists:  Gabriela Nelson, Ph.D. Hilaria Ordonez,  Ph.D. Sourav Carr 7738      Visit Date: 2/3/2022   Time of appointment:  4:00-4:45 pm   Time spent with Patient: 45 minutes. This is patient's tenth appointment. Reason for Consult: Follow-up, Depression, and Anxiety     Referring Provider/PCP:    No ref. provider found  Pushpa Rogers DO      Pt was seen for a virtual visit (using audio & video) due to the COVID-19 pandemic via [employer]. She provided informed consent for the behavioral health program and for using telehealth. Pt was alone in her home during the visit. Clinician location: [facility]'s office in [place], [place] . Discussed with patient model of service to include the limits of confidentiality (i.e. abuse reporting, suicide intervention, etc.) and short-term intervention focused approach. Discussed the risks to using telehealth (I.e., service disruptions). Also discussed with patient that the service provider is a supervised clinician and is being supervised by Dr. Chinedu Patterson or Dr. Malcolm Weldon. Pt indicated understanding. Leonroa Leyva is a 42 y.o. female who presents for follow up of depression and anxiety. She reported she has not decided what to do with the gift for her granddaughter and that it is sitting in her trunk. She reported her son recently washed her late graddaughter's bottle and she is \"devastated\" and upset. She stated the bottle had her granddaughter's finger prints and scent on it. She is sad that she no longer has the bottle as a reminder. Discussed Shanna's memories of her granddaughter and what she misses about her. Patient became tearful and smiled when talking about her granddaughter. Roxshirazkeena Tha stated working is going better and she no longer wants to turn her car around when she gets to work. Discussed [facility] paperwork items.      Previous Recommendations:  1) Follow up in 2 weeks  2) Pt will decide what to do with the gift for granddaughter      MENTAL STATUS EXAM  Mood was depressed with congruent affect. Suicidal ideation was denied. Homicidal ideation was denied. Hygiene was good. Dress was appropriate. Behavior was Within Normal Limits with No observation of difficulties ambulating. Attitude was Cooperative and [place]. Eye-contact was good. Speech: rate - WNL, rhythm -  WNL, volume - WNL  Verbalizations were goal directed and coherent. Thought processes were intact and goal-oriented without evidence of delusions, hallucinations, obsessions, or jamar. Associations were characterized by intact cognitive processes. Pt was oriented to person, place, time, and general circumstances;  recent:  good and remote:  good. Insight and judgment were estimated to be good, AEB, a good  understanding of cyclical maladaptive patterns, and the ability to use insight to inform behavior change. ASSESSMENT  Hilary Mohr presented to the appointment today for evaluation and treatment of symptoms of depression, anxiety. She is currently considered to be of no risk to herself or others. Pt continues to present with symptoms consistent with a diagnosis of adjustment disorder. Pt will likely continue to benefit from treatment focused on symptoms. Pt was in agreement with recommendations.     PHQ Scores 11/30/2021 11/30/2021 11/3/2021 9/8/2021 8/31/2021 1/5/2021 12/1/2020   PHQ2 Score 6 6 6 6 6 0 0   PHQ9 Score 24 24 24 21 6 0 0     Interpretation of Total Score Depression Severity: 1-4 = Minimal depression, 5-9 = Mild depression, 10-14 = Moderate depression, 15-19 = Moderately severe depression, 20-27 = Severe depression    How often pt has had thoughts of death or hurting self (if PHQ positive for depression):       FADUMO 7 SCORE 11/30/2021   FADUMO-7 Total Score 21     Interpretation of FADUMO-7 score: 5-9 = mild anxiety, 10-14 = moderate anxiety, 15+ = severe anxiety. Recommend referral to behavioral health for scores 10 or greater. DIAGNOSIS  Saskia Rosas was seen today for follow-up, depression and anxiety. Diagnoses and all orders for this visit:    Adjustment disorder with mixed anxiety and depressed mood          INTERVENTION  Discussed self-care (sleep, nutrition, rewarding activities, social support, exercise), Established rapport, Martin-setting to identify pt's [facility] / overall health, Supportive techniques, Emphasized self-care as important for managing overall health and Identified maladaptive thoughts    Pt was engaged throughout the visit and responded well to interventions.      PLAN  1) Follow up in 2 weeks      Electronically signed by Maryanne Mckenzie on 2/3/22 at 5:17 PM EST

## 2022-02-08 ENCOUNTER — TELEPHONE (OUTPATIENT)
Age: 47
End: 2022-02-08

## 2022-02-08 NOTE — TELEPHONE ENCOUNTER
Left voicemail for patient regarding bariatric evaluation referrals. Left call back number. Will send patient MyChart message with referrals.

## 2022-02-22 ENCOUNTER — TELEPHONE (OUTPATIENT)
Age: 47
End: 2022-02-22

## 2022-02-22 NOTE — TELEPHONE ENCOUNTER
Left voicemail for patient regarding rescheduling appointment to 3/8 at 4 pm. Left call back number.

## 2022-03-25 ENCOUNTER — HOSPITAL ENCOUNTER (OUTPATIENT)
Age: 47
Discharge: HOME | End: 2022-03-25
Payer: COMMERCIAL

## 2022-03-25 PROCEDURE — G0480 DRUG TEST DEF 1-7 CLASSES: HCPCS

## 2022-03-29 LAB
3-OH-COTININE URINE: 1577 NG/ML
ANABASINE URINE: <5 NG/ML
COTININE, URINE: 309 NG/ML
NICOTINE URINE: 17 NG/ML

## 2022-04-04 RX ORDER — AMLODIPINE BESYLATE 10 MG/1
TABLET ORAL
Qty: 30 TABLET | Refills: 0 | Status: SHIPPED | OUTPATIENT
Start: 2022-04-04 | End: 2022-06-01

## 2022-04-04 RX ORDER — LOSARTAN POTASSIUM 50 MG/1
TABLET ORAL
Qty: 30 TABLET | Refills: 0 | Status: SHIPPED | OUTPATIENT
Start: 2022-04-04 | End: 2022-06-01

## 2022-04-04 NOTE — TELEPHONE ENCOUNTER
Meaghan Frederick is calling to request a refill on the following medication(s):    Last Visit Date (If Applicable):  47/04/6712    Next Visit Date:    Visit date not found    Medication Request:  Requested Prescriptions     Pending Prescriptions Disp Refills    amLODIPine (NORVASC) 10 MG tablet [Pharmacy Med Name: amLODIPine Besylate 10 MG Oral Tablet] 30 tablet 0     Sig: Take 1 tablet by mouth once daily    losartan (COZAAR) 50 MG tablet [Pharmacy Med Name: Losartan Potassium 50 MG Oral Tablet] 30 tablet 0     Sig: Take 1 tablet by mouth once daily

## 2022-05-09 ENCOUNTER — HOSPITAL ENCOUNTER (OUTPATIENT)
Age: 47
Discharge: HOME | End: 2022-05-09
Payer: COMMERCIAL

## 2022-05-09 PROCEDURE — G0480 DRUG TEST DEF 1-7 CLASSES: HCPCS

## 2022-05-12 LAB
3-OH-COTININE URINE: <50 NG/ML
ANABASINE URINE: <5 NG/ML
COTININE, URINE: <15 NG/ML
NICOTINE URINE: <15 NG/ML

## 2022-05-17 ENCOUNTER — HOSPITAL ENCOUNTER (OUTPATIENT)
Age: 47
Discharge: HOME | End: 2022-05-17
Payer: COMMERCIAL

## 2022-05-17 PROCEDURE — G0480 DRUG TEST DEF 1-7 CLASSES: HCPCS

## 2022-05-27 ENCOUNTER — HOSPITAL ENCOUNTER (OUTPATIENT)
Age: 47
Discharge: HOME | End: 2022-05-27
Payer: COMMERCIAL

## 2022-05-27 PROCEDURE — G0480 DRUG TEST DEF 1-7 CLASSES: HCPCS

## 2022-05-31 NOTE — TELEPHONE ENCOUNTER
Eliseo Hamm is calling to request a refill on the following medication(s):    Last Visit Date (If Applicable):  60/84/1052    Next Visit Date:    Visit date not found    Medication Request:  Requested Prescriptions     Pending Prescriptions Disp Refills    losartan (COZAAR) 50 mg tablet [Pharmacy Med Name: Losartan Potassium 50 MG Oral Tablet] 30 tablet 0     Sig: Take 1 tablet by mouth once daily    amLODIPine (NORVASC) 10 MG tablet [Pharmacy Med Name: amLODIPine Besylate 10 MG Oral Tablet] 30 tablet 0     Sig: Take 1 tablet by mouth once daily

## 2022-06-01 RX ORDER — AMLODIPINE BESYLATE 10 MG/1
TABLET ORAL
Qty: 30 TABLET | Refills: 0 | Status: SHIPPED | OUTPATIENT
Start: 2022-06-01 | End: 2022-09-28

## 2022-06-01 RX ORDER — LOSARTAN POTASSIUM 50 MG/1
TABLET ORAL
Qty: 30 TABLET | Refills: 0 | Status: SHIPPED | OUTPATIENT
Start: 2022-06-01 | End: 2022-09-28

## 2022-06-27 ENCOUNTER — HOSPITAL ENCOUNTER (OUTPATIENT)
Age: 47
Discharge: HOME | End: 2022-06-27
Payer: COMMERCIAL

## 2022-06-27 LAB
ABSOLUTE EOS #: 0.18 K/UL (ref 0–0.44)
ABSOLUTE IMMATURE GRANULOCYTE: <0.03 K/UL (ref 0–0.3)
ABSOLUTE LYMPH #: 1.68 K/UL (ref 1.1–3.7)
ABSOLUTE MONO #: 0.47 K/UL (ref 0.1–1.2)
ALBUMIN SERPL-MCNC: 3.9 G/DL (ref 3.5–5.2)
ALBUMIN/GLOBULIN RATIO: 1.1 (ref 1–2.5)
ALP BLD-CCNC: 75 U/L (ref 35–104)
ALT SERPL-CCNC: 16 U/L (ref 5–33)
ANION GAP SERPL CALCULATED.3IONS-SCNC: 14 MMOL/L (ref 9–17)
AST SERPL-CCNC: 21 U/L
BASOPHILS # BLD: 0 % (ref 0–2)
BASOPHILS ABSOLUTE: <0.03 K/UL (ref 0–0.2)
BILIRUB SERPL-MCNC: <0.1 MG/DL (ref 0.3–1.2)
BUN BLDV-MCNC: 15 MG/DL (ref 6–20)
CALCIUM SERPL-MCNC: 9 MG/DL (ref 8.6–10.4)
CHLORIDE BLD-SCNC: 103 MMOL/L (ref 98–107)
CO2: 20 MMOL/L (ref 20–31)
CREAT SERPL-MCNC: 1.34 MG/DL (ref 0.5–0.9)
EOSINOPHILS RELATIVE PERCENT: 3 % (ref 1–4)
GFR AFRICAN AMERICAN: 52 ML/MIN
GFR NON-AFRICAN AMERICAN: 43 ML/MIN
GFR SERPL CREATININE-BSD FRML MDRD: ABNORMAL ML/MIN/{1.73_M2}
GLUCOSE BLD-MCNC: 121 MG/DL (ref 70–99)
HCT VFR BLD CALC: 33.4 % (ref 36.3–47.1)
HEMOGLOBIN: 10.5 G/DL (ref 11.9–15.1)
IMMATURE GRANULOCYTES: 0 %
LYMPHOCYTES # BLD: 31 % (ref 24–43)
MCH RBC QN AUTO: 26.4 PG (ref 25.2–33.5)
MCHC RBC AUTO-ENTMCNC: 31.4 G/DL (ref 28.4–34.8)
MCV RBC AUTO: 84.1 FL (ref 82.6–102.9)
MONOCYTES # BLD: 9 % (ref 3–12)
NRBC AUTOMATED: 0 PER 100 WBC
PDW BLD-RTO: 15.9 % (ref 11.8–14.4)
PLATELET # BLD: 260 K/UL (ref 138–453)
PMV BLD AUTO: 11.7 FL (ref 8.1–13.5)
POTASSIUM SERPL-SCNC: 4.1 MMOL/L (ref 3.7–5.3)
RBC # BLD: 3.97 M/UL (ref 3.95–5.11)
RBC # BLD: ABNORMAL 10*6/UL
SEG NEUTROPHILS: 57 % (ref 36–65)
SEGMENTED NEUTROPHILS ABSOLUTE COUNT: 3.15 K/UL (ref 1.5–8.1)
SODIUM BLD-SCNC: 137 MMOL/L (ref 135–144)
TOTAL PROTEIN: 7.6 G/DL (ref 6.4–8.3)
WBC # BLD: 5.5 K/UL (ref 3.5–11.3)

## 2022-06-27 PROCEDURE — 85025 COMPLETE CBC W/AUTO DIFF WBC: CPT

## 2022-06-27 PROCEDURE — 36415 COLL VENOUS BLD VENIPUNCTURE: CPT

## 2022-06-27 PROCEDURE — 80053 COMPREHEN METABOLIC PANEL: CPT

## 2022-09-28 RX ORDER — LOSARTAN POTASSIUM 50 MG/1
TABLET ORAL
Qty: 30 TABLET | Refills: 0 | Status: SHIPPED | OUTPATIENT
Start: 2022-09-28

## 2022-09-28 RX ORDER — AMLODIPINE BESYLATE 10 MG/1
TABLET ORAL
Qty: 30 TABLET | Refills: 0 | Status: SHIPPED | OUTPATIENT
Start: 2022-09-28

## 2022-09-28 NOTE — TELEPHONE ENCOUNTER
Erna Yousif is calling to request a refill on the following medication(s):    Last Visit Date (If Applicable):  23/47/8961    Next Visit Date:    Visit date not found    Medication Request:  Requested Prescriptions     Pending Prescriptions Disp Refills    losartan (COZAAR) 50 MG tablet [Pharmacy Med Name: Losartan Potassium 50 MG Oral Tablet] 30 tablet 0     Sig: Take 1 tablet by mouth once daily    amLODIPine (NORVASC) 10 MG tablet [Pharmacy Med Name: amLODIPine Besylate 10 MG Oral Tablet] 30 tablet 0     Sig: Take 1 tablet by mouth once daily

## 2023-01-17 NOTE — TELEPHONE ENCOUNTER
Sami Zapata is calling to request a refill on the following medication(s):    Last Visit Date (If Applicable):  94/11/3742    Next Visit Date:    Visit date not found    Medication Request:  Requested Prescriptions     Pending Prescriptions Disp Refills    amLODIPine (NORVASC) 10 MG tablet [Pharmacy Med Name: amLODIPine Besylate 10 MG Oral Tablet] 30 tablet 0     Sig: Take 1 tablet by mouth once daily    losartan (COZAAR) 50 MG tablet [Pharmacy Med Name: Losartan Potassium 50 MG Oral Tablet] 30 tablet 0     Sig: Take 1 tablet by mouth once daily

## 2023-01-18 RX ORDER — LOSARTAN POTASSIUM 50 MG/1
TABLET ORAL
Qty: 30 TABLET | Refills: 0 | Status: SHIPPED | OUTPATIENT
Start: 2023-01-18

## 2023-01-18 RX ORDER — AMLODIPINE BESYLATE 10 MG/1
TABLET ORAL
Qty: 30 TABLET | Refills: 0 | Status: SHIPPED | OUTPATIENT
Start: 2023-01-18

## 2023-02-03 ENCOUNTER — HOSPITAL ENCOUNTER (OUTPATIENT)
Age: 48
Discharge: HOME | End: 2023-02-03
Payer: COMMERCIAL

## 2023-02-03 LAB
25(OH)D3 SERPL-MCNC: 24.4 NG/ML
ALBUMIN SERPL-MCNC: 3.9 G/DL (ref 3.5–5.2)
ALBUMIN/GLOBULIN RATIO: 1.2 (ref 1–2.5)
ALP SERPL-CCNC: 80 U/L (ref 35–104)
ALT SERPL-CCNC: 20 U/L (ref 5–33)
ANION GAP SERPL CALCULATED.3IONS-SCNC: 9 MMOL/L (ref 9–17)
AST SERPL-CCNC: 19 U/L
BILIRUB SERPL-MCNC: 0.2 MG/DL (ref 0.3–1.2)
BUN SERPL-MCNC: 9 MG/DL (ref 6–20)
CALCIUM SERPL-MCNC: 9.1 MG/DL (ref 8.6–10.4)
CHLORIDE SERPL-SCNC: 108 MMOL/L (ref 98–107)
CHOLEST SERPL-MCNC: 186 MG/DL
CHOLESTEROL/HDL RATIO: 3
CO2 SERPL-SCNC: 23 MMOL/L (ref 20–31)
CREAT SERPL-MCNC: 0.86 MG/DL (ref 0.5–0.9)
EST. AVERAGE GLUCOSE BLD GHB EST-MCNC: 105 MG/DL
FERRITIN SERPL-MCNC: 11 NG/ML (ref 13–150)
FOLATE SERPL-MCNC: 9 NG/ML
GFR SERPL CREATININE-BSD FRML MDRD: >60 ML/MIN/1.73M2
GLUCOSE SERPL-MCNC: 82 MG/DL (ref 70–99)
HBA1C MFR BLD: 5.3 % (ref 4–6)
HCT VFR BLD AUTO: 31.3 % (ref 36.3–47.1)
HDLC SERPL-MCNC: 61 MG/DL
HGB BLD-MCNC: 9.5 G/DL (ref 11.9–15.1)
IRON SATURATION: 13 % (ref 20–55)
IRON SERPL-MCNC: 42 UG/DL (ref 37–145)
LDLC SERPL CALC-MCNC: 114 MG/DL (ref 0–130)
MCH RBC QN AUTO: 24.2 PG (ref 25.2–33.5)
MCHC RBC AUTO-ENTMCNC: 30.4 G/DL (ref 28.4–34.8)
MCV RBC AUTO: 79.8 FL (ref 82.6–102.9)
NRBC AUTOMATED: 0 PER 100 WBC
PDW BLD-RTO: 15.7 % (ref 11.8–14.4)
PLATELET # BLD AUTO: 233 K/UL (ref 138–453)
PMV BLD AUTO: 11.7 FL (ref 8.1–13.5)
POTASSIUM SERPL-SCNC: 3.6 MMOL/L (ref 3.7–5.3)
PROT SERPL-MCNC: 7.2 G/DL (ref 6.4–8.3)
RBC # BLD: 3.92 M/UL (ref 3.95–5.11)
SODIUM SERPL-SCNC: 140 MMOL/L (ref 135–144)
TIBC SERPL-MCNC: 320 UG/DL (ref 250–450)
TRIGL SERPL-MCNC: 56 MG/DL
TSH SERPL-ACNC: 0.55 UIU/ML (ref 0.3–5)
UNSATURATED IRON BINDING CAPACITY: 278 UG/DL (ref 112–347)
VIT B12 SERPL-MCNC: 1056 PG/ML (ref 232–1245)
WBC # BLD AUTO: 2.7 K/UL (ref 3.5–11.3)

## 2023-02-03 PROCEDURE — 84630 ASSAY OF ZINC: CPT

## 2023-02-03 PROCEDURE — 83540 ASSAY OF IRON: CPT

## 2023-02-03 PROCEDURE — 80061 LIPID PANEL: CPT

## 2023-02-03 PROCEDURE — 82607 VITAMIN B-12: CPT

## 2023-02-03 PROCEDURE — 82746 ASSAY OF FOLIC ACID SERUM: CPT

## 2023-02-03 PROCEDURE — 84207 ASSAY OF VITAMIN B-6: CPT

## 2023-02-03 PROCEDURE — 85027 COMPLETE CBC AUTOMATED: CPT

## 2023-02-03 PROCEDURE — 80053 COMPREHEN METABOLIC PANEL: CPT

## 2023-02-03 PROCEDURE — 84425 ASSAY OF VITAMIN B-1: CPT

## 2023-02-03 PROCEDURE — 84446 ASSAY OF VITAMIN E: CPT

## 2023-02-03 PROCEDURE — 82728 ASSAY OF FERRITIN: CPT

## 2023-02-03 PROCEDURE — 36415 COLL VENOUS BLD VENIPUNCTURE: CPT

## 2023-02-03 PROCEDURE — 83550 IRON BINDING TEST: CPT

## 2023-02-03 PROCEDURE — 82306 VITAMIN D 25 HYDROXY: CPT

## 2023-02-03 PROCEDURE — 84443 ASSAY THYROID STIM HORMONE: CPT

## 2023-02-03 PROCEDURE — 83036 HEMOGLOBIN GLYCOSYLATED A1C: CPT

## 2023-02-05 LAB — ZINC: 84 UG/DL (ref 60–120)

## 2023-03-28 RX ORDER — AMLODIPINE BESYLATE 10 MG/1
TABLET ORAL
Qty: 30 TABLET | Refills: 0 | Status: SHIPPED | OUTPATIENT
Start: 2023-03-28

## 2023-03-28 RX ORDER — LOSARTAN POTASSIUM 50 MG/1
TABLET ORAL
Qty: 30 TABLET | Refills: 0 | Status: SHIPPED | OUTPATIENT
Start: 2023-03-28

## 2023-03-28 NOTE — TELEPHONE ENCOUNTER
Annabel Peres is calling to request a refill on the following medication(s):    Last Visit Date (If Applicable):  99/93/1817    Next Visit Date:    Visit date not found    Medication Request:  Requested Prescriptions     Pending Prescriptions Disp Refills    losartan (COZAAR) 50 MG tablet [Pharmacy Med Name: Losartan Potassium 50 MG Oral Tablet] 30 tablet 0     Sig: Take 1 tablet by mouth once daily    amLODIPine (NORVASC) 10 MG tablet [Pharmacy Med Name: amLODIPine Besylate 10 MG Oral Tablet] 30 tablet 0     Sig: Take 1 tablet by mouth once daily

## 2023-08-18 ENCOUNTER — HOSPITAL ENCOUNTER (OUTPATIENT)
Age: 48
Discharge: HOME | End: 2023-08-18
Payer: COMMERCIAL

## 2023-08-18 LAB
25(OH)D3 SERPL-MCNC: 18.7 NG/ML
ALBUMIN SERPL-MCNC: 4.2 G/DL (ref 3.5–5.2)
ALBUMIN/GLOB SERPL: 1.1 {RATIO} (ref 1–2.5)
ALP SERPL-CCNC: 86 U/L (ref 35–104)
ALT SERPL-CCNC: 27 U/L (ref 5–33)
ANION GAP SERPL CALCULATED.3IONS-SCNC: 12 MMOL/L (ref 9–17)
AST SERPL-CCNC: 25 U/L
BILIRUB SERPL-MCNC: 0.2 MG/DL (ref 0.3–1.2)
BUN SERPL-MCNC: 15 MG/DL (ref 6–20)
CALCIUM SERPL-MCNC: 9.1 MG/DL (ref 8.6–10.4)
CHLORIDE SERPL-SCNC: 105 MMOL/L (ref 98–107)
CHOLEST SERPL-MCNC: 176 MG/DL
CHOLESTEROL/HDL RATIO: 2.4
CO2 SERPL-SCNC: 22 MMOL/L (ref 20–31)
CREAT SERPL-MCNC: 1 MG/DL (ref 0.5–0.9)
ERYTHROCYTE [DISTWIDTH] IN BLOOD BY AUTOMATED COUNT: 16.9 % (ref 11.8–14.4)
FOLATE SERPL-MCNC: 13.7 NG/ML
GFR SERPL CREATININE-BSD FRML MDRD: >60 ML/MIN/1.73M2
GLUCOSE SERPL-MCNC: 84 MG/DL (ref 70–99)
HCT VFR BLD AUTO: 32.7 % (ref 36.3–47.1)
HDLC SERPL-MCNC: 74 MG/DL
HGB BLD-MCNC: 9.2 G/DL (ref 11.9–15.1)
IRON SATN MFR SERPL: 8 % (ref 20–55)
IRON SERPL-MCNC: 35 UG/DL (ref 37–145)
LDLC SERPL CALC-MCNC: 86 MG/DL (ref 0–130)
MCH RBC QN AUTO: 20.8 PG (ref 25.2–33.5)
MCHC RBC AUTO-ENTMCNC: 28.1 G/DL (ref 28.4–34.8)
MCV RBC AUTO: 74 FL (ref 82.6–102.9)
NRBC BLD-RTO: 0 PER 100 WBC
PLATELET # BLD AUTO: 245 K/UL (ref 138–453)
PMV BLD AUTO: 10.9 FL (ref 8.1–13.5)
POTASSIUM SERPL-SCNC: 3.9 MMOL/L (ref 3.7–5.3)
PROT SERPL-MCNC: 8 G/DL (ref 6.4–8.3)
RBC # BLD AUTO: 4.42 M/UL (ref 3.95–5.11)
SODIUM SERPL-SCNC: 139 MMOL/L (ref 135–144)
TIBC SERPL-MCNC: 415 UG/DL (ref 250–450)
TRIGL SERPL-MCNC: 81 MG/DL
TSH SERPL DL<=0.05 MIU/L-ACNC: 0.53 UIU/ML (ref 0.3–5)
UNSATURATED IRON BINDING CAPACITY: 380 UG/DL (ref 112–347)
VIT B12 SERPL-MCNC: 441 PG/ML (ref 232–1245)
WBC OTHER # BLD: 3.9 K/UL (ref 3.5–11.3)

## 2023-08-18 PROCEDURE — 83550 IRON BINDING TEST: CPT

## 2023-08-18 PROCEDURE — 80053 COMPREHEN METABOLIC PANEL: CPT

## 2023-08-18 PROCEDURE — 36415 COLL VENOUS BLD VENIPUNCTURE: CPT

## 2023-08-18 PROCEDURE — 84446 ASSAY OF VITAMIN E: CPT

## 2023-08-18 PROCEDURE — 82746 ASSAY OF FOLIC ACID SERUM: CPT

## 2023-08-18 PROCEDURE — 84443 ASSAY THYROID STIM HORMONE: CPT

## 2023-08-18 PROCEDURE — 85027 COMPLETE CBC AUTOMATED: CPT

## 2023-08-18 PROCEDURE — 82306 VITAMIN D 25 HYDROXY: CPT

## 2023-08-18 PROCEDURE — 82607 VITAMIN B-12: CPT

## 2023-08-18 PROCEDURE — 84630 ASSAY OF ZINC: CPT

## 2023-08-18 PROCEDURE — 84207 ASSAY OF VITAMIN B-6: CPT

## 2023-08-18 PROCEDURE — 84425 ASSAY OF VITAMIN B-1: CPT

## 2023-08-18 PROCEDURE — 83540 ASSAY OF IRON: CPT

## 2023-08-18 PROCEDURE — 80061 LIPID PANEL: CPT

## 2023-08-20 LAB — ZINC SERPL-MCNC: 80.2 UG/DL (ref 60–120)

## 2023-08-21 LAB
ALPHA-TOCOPHEROL: 10.9 MG/L (ref 5.5–18)
GAMMA-TOCOPHEROL: 0.6 MG/L (ref 0–6)

## 2023-08-22 LAB — PYRIDOXAL PHOS SERPL-SCNC: 39.5 NMOL/L (ref 20–125)

## 2023-08-23 LAB — VIT B1 PYROPHOSHATE BLD-SCNC: 104 NMOL/L (ref 70–180)

## 2023-11-29 ENCOUNTER — OFFICE VISIT (OUTPATIENT)
Dept: FAMILY MEDICINE CLINIC | Age: 48
End: 2023-11-29
Payer: COMMERCIAL

## 2023-11-29 ENCOUNTER — HOSPITAL ENCOUNTER (OUTPATIENT)
Age: 48
Setting detail: SPECIMEN
Discharge: HOME | End: 2023-11-29

## 2023-11-29 VITALS
SYSTOLIC BLOOD PRESSURE: 132 MMHG | BODY MASS INDEX: 27.49 KG/M2 | HEART RATE: 89 BPM | DIASTOLIC BLOOD PRESSURE: 84 MMHG | HEIGHT: 64 IN | WEIGHT: 161 LBS | OXYGEN SATURATION: 99 %

## 2023-11-29 DIAGNOSIS — B37.9 CANDIDIASIS: ICD-10-CM

## 2023-11-29 DIAGNOSIS — R35.0 URINARY FREQUENCY: ICD-10-CM

## 2023-11-29 DIAGNOSIS — R35.0 URINARY FREQUENCY: Primary | ICD-10-CM

## 2023-11-29 DIAGNOSIS — F33.0 MILD EPISODE OF RECURRENT MAJOR DEPRESSIVE DISORDER (HCC): ICD-10-CM

## 2023-11-29 PROBLEM — F33.9 MAJOR DEPRESSIVE DISORDER, RECURRENT, UNSPECIFIED (HCC): Status: ACTIVE | Noted: 2023-11-29

## 2023-11-29 PROBLEM — F33.1 MAJOR DEPRESSIVE DISORDER, RECURRENT, MODERATE (HCC): Status: ACTIVE | Noted: 2023-11-29

## 2023-11-29 LAB
BILIRUBIN, POC: NORMAL
BLOOD URINE, POC: NORMAL
CLARITY, POC: CLEAR
COLOR, POC: YELLOW
GLUCOSE URINE, POC: NORMAL
KETONES, POC: NORMAL
LEUKOCYTE EST, POC: NORMAL
NITRITE, POC: NORMAL
PH, POC: 5.5
PROTEIN, POC: NORMAL
SPECIFIC GRAVITY, POC: >=1.03
UROBILINOGEN, POC: 0.2

## 2023-11-29 PROCEDURE — 3075F SYST BP GE 130 - 139MM HG: CPT | Performed by: FAMILY MEDICINE

## 2023-11-29 PROCEDURE — 3079F DIAST BP 80-89 MM HG: CPT | Performed by: FAMILY MEDICINE

## 2023-11-29 PROCEDURE — 81003 URINALYSIS AUTO W/O SCOPE: CPT | Performed by: FAMILY MEDICINE

## 2023-11-29 PROCEDURE — 99214 OFFICE O/P EST MOD 30 MIN: CPT | Performed by: FAMILY MEDICINE

## 2023-11-29 RX ORDER — CHOLECALCIFEROL (VITAMIN D3) 1250 MCG
1 CAPSULE ORAL WEEKLY
COMMUNITY
Start: 2023-09-20

## 2023-11-29 RX ORDER — FERROUS SULFATE 325(65) MG
TABLET ORAL
COMMUNITY
Start: 2023-11-19

## 2023-11-29 SDOH — ECONOMIC STABILITY: FOOD INSECURITY: WITHIN THE PAST 12 MONTHS, THE FOOD YOU BOUGHT JUST DIDN'T LAST AND YOU DIDN'T HAVE MONEY TO GET MORE.: NEVER TRUE

## 2023-11-29 SDOH — ECONOMIC STABILITY: INCOME INSECURITY: HOW HARD IS IT FOR YOU TO PAY FOR THE VERY BASICS LIKE FOOD, HOUSING, MEDICAL CARE, AND HEATING?: NOT HARD AT ALL

## 2023-11-29 SDOH — ECONOMIC STABILITY: FOOD INSECURITY: WITHIN THE PAST 12 MONTHS, YOU WORRIED THAT YOUR FOOD WOULD RUN OUT BEFORE YOU GOT MONEY TO BUY MORE.: NEVER TRUE

## 2023-11-29 SDOH — ECONOMIC STABILITY: HOUSING INSECURITY
IN THE LAST 12 MONTHS, WAS THERE A TIME WHEN YOU DID NOT HAVE A STEADY PLACE TO SLEEP OR SLEPT IN A SHELTER (INCLUDING NOW)?: NO

## 2023-11-29 ASSESSMENT — PATIENT HEALTH QUESTIONNAIRE - PHQ9
SUM OF ALL RESPONSES TO PHQ9 QUESTIONS 1 & 2: 0
2. FEELING DOWN, DEPRESSED OR HOPELESS: 0
SUM OF ALL RESPONSES TO PHQ QUESTIONS 1-9: 0
SUM OF ALL RESPONSES TO PHQ QUESTIONS 1-9: 0
1. LITTLE INTEREST OR PLEASURE IN DOING THINGS: 0
SUM OF ALL RESPONSES TO PHQ QUESTIONS 1-9: 0
SUM OF ALL RESPONSES TO PHQ QUESTIONS 1-9: 0

## 2023-11-30 LAB
MICROORGANISM SPEC CULT: NO GROWTH
SPECIMEN DESCRIPTION: NORMAL

## 2024-01-10 NOTE — TELEPHONE ENCOUNTER
PT only has 3 blood pressure pills left, he next appt is on 6/21 at 5pm.    please contact patient and let her know if this can be called in.     Thank you (4) no limitation

## 2024-03-01 ENCOUNTER — HOSPITAL ENCOUNTER (OUTPATIENT)
Age: 49
Discharge: HOME | End: 2024-03-01
Payer: COMMERCIAL

## 2024-03-01 LAB
FERRITIN SERPL-MCNC: 12 NG/ML (ref 13–150)
IRON SATN MFR SERPL: 4 % (ref 20–55)
IRON SERPL-MCNC: 14 UG/DL (ref 37–145)
TIBC SERPL-MCNC: 359 UG/DL (ref 250–450)
UNSATURATED IRON BINDING CAPACITY: 345 UG/DL (ref 112–347)

## 2024-03-01 PROCEDURE — 82728 ASSAY OF FERRITIN: CPT

## 2024-03-01 PROCEDURE — 83540 ASSAY OF IRON: CPT

## 2024-03-01 PROCEDURE — 36415 COLL VENOUS BLD VENIPUNCTURE: CPT

## 2024-03-01 PROCEDURE — 83550 IRON BINDING TEST: CPT

## 2024-04-01 ENCOUNTER — OFFICE VISIT (OUTPATIENT)
Dept: FAMILY MEDICINE CLINIC | Age: 49
End: 2024-04-01
Payer: COMMERCIAL

## 2024-04-01 VITALS
DIASTOLIC BLOOD PRESSURE: 80 MMHG | WEIGHT: 163 LBS | BODY MASS INDEX: 27.83 KG/M2 | SYSTOLIC BLOOD PRESSURE: 131 MMHG | HEART RATE: 74 BPM | HEIGHT: 64 IN | OXYGEN SATURATION: 100 %

## 2024-04-01 DIAGNOSIS — F43.29 GRIEF REACTION WITH PROLONGED BEREAVEMENT: Primary | ICD-10-CM

## 2024-04-01 DIAGNOSIS — F33.0 MILD EPISODE OF RECURRENT MAJOR DEPRESSIVE DISORDER (HCC): ICD-10-CM

## 2024-04-01 DIAGNOSIS — F33.1 MAJOR DEPRESSIVE DISORDER, RECURRENT, MODERATE (HCC): ICD-10-CM

## 2024-04-01 DIAGNOSIS — F51.01 PRIMARY INSOMNIA: ICD-10-CM

## 2024-04-01 DIAGNOSIS — E65 ABDOMINAL PANNUS: ICD-10-CM

## 2024-04-01 DIAGNOSIS — F41.9 ANXIETY: ICD-10-CM

## 2024-04-01 PROCEDURE — 3079F DIAST BP 80-89 MM HG: CPT | Performed by: FAMILY MEDICINE

## 2024-04-01 PROCEDURE — 99214 OFFICE O/P EST MOD 30 MIN: CPT | Performed by: FAMILY MEDICINE

## 2024-04-01 PROCEDURE — 3075F SYST BP GE 130 - 139MM HG: CPT | Performed by: FAMILY MEDICINE

## 2024-04-01 RX ORDER — ALPRAZOLAM 0.5 MG/1
0.5 TABLET ORAL NIGHTLY PRN
Qty: 30 TABLET | Refills: 0 | Status: SHIPPED | OUTPATIENT
Start: 2024-04-01 | End: 2024-05-01

## 2024-04-01 RX ORDER — DULOXETIN HYDROCHLORIDE 60 MG/1
60 CAPSULE, DELAYED RELEASE ORAL DAILY
Qty: 30 CAPSULE | Refills: 11 | Status: SHIPPED | OUTPATIENT
Start: 2024-04-01

## 2024-04-01 ASSESSMENT — PATIENT HEALTH QUESTIONNAIRE - PHQ9
SUM OF ALL RESPONSES TO PHQ QUESTIONS 1-9: 3
SUM OF ALL RESPONSES TO PHQ9 QUESTIONS 1 & 2: 0
6. FEELING BAD ABOUT YOURSELF - OR THAT YOU ARE A FAILURE OR HAVE LET YOURSELF OR YOUR FAMILY DOWN: NOT AT ALL
3. TROUBLE FALLING OR STAYING ASLEEP: NEARLY EVERY DAY
SUM OF ALL RESPONSES TO PHQ QUESTIONS 1-9: 3
9. THOUGHTS THAT YOU WOULD BE BETTER OFF DEAD, OR OF HURTING YOURSELF: NOT AT ALL
8. MOVING OR SPEAKING SO SLOWLY THAT OTHER PEOPLE COULD HAVE NOTICED. OR THE OPPOSITE, BEING SO FIGETY OR RESTLESS THAT YOU HAVE BEEN MOVING AROUND A LOT MORE THAN USUAL: NOT AT ALL
SUM OF ALL RESPONSES TO PHQ QUESTIONS 1-9: 3
5. POOR APPETITE OR OVEREATING: NOT AT ALL
2. FEELING DOWN, DEPRESSED OR HOPELESS: NOT AT ALL
10. IF YOU CHECKED OFF ANY PROBLEMS, HOW DIFFICULT HAVE THESE PROBLEMS MADE IT FOR YOU TO DO YOUR WORK, TAKE CARE OF THINGS AT HOME, OR GET ALONG WITH OTHER PEOPLE: NOT DIFFICULT AT ALL
7. TROUBLE CONCENTRATING ON THINGS, SUCH AS READING THE NEWSPAPER OR WATCHING TELEVISION: NOT AT ALL
4. FEELING TIRED OR HAVING LITTLE ENERGY: NOT AT ALL
SUM OF ALL RESPONSES TO PHQ QUESTIONS 1-9: 3
1. LITTLE INTEREST OR PLEASURE IN DOING THINGS: NOT AT ALL

## 2024-04-01 NOTE — PROGRESS NOTES
[facility]  [facility] MEDICINE  [address]  [address]  [facility] [address]  Dept: [phone]      Shanna Xiao is a 48 y.o. female who presents today for follow up on her  medical conditions as noted below.      Chief Complaint   Patient presents with    Pre-op Exam    Insomnia       Patient Active Problem List:     Obesity (BMI 30-39.9)     Dyslipidemia     Chronic lower back pain     Vitamin D deficiency     Essential hypertension     Status post laparoscopic sleeve gastrectomy     History of bilateral breast reduction surgery     Epigastric pain     Major depressive disorder, recurrent, mild     Major depressive disorder, recurrent, moderate     Major depressive disorder, recurrent, unspecified     Past Medical History:   Diagnosis Date    Arthritis     right knee    Congestion of nasal sinus     Diabetes mellitus (HCC)     GERD (gastroesophageal reflux disease)     Hyperlipidemia     Hypertension     Obesity     Sinus congestion     Sleep apnea     C pap machine    Status post laparoscopic sleeve gastrectomy 4-18-16    [facility] wt = 224lb, initial wt = 219lb      Past Surgical History:   Procedure Laterality Date    BREAST SURGERY Bilateral 2005    breast reduction    KNEE SURGERY Left     torn ligaments    SLEEVE GASTRECTOMY  4-18-16    with EGD, liver Bx, lap marco a     Family History   Problem Relation Age of Onset    High Cholesterol Mother     Arthritis Mother     High Blood Pressure Mother     Substance Abuse Father     Heart Disease Sister     Diabetes Maternal Aunt     Substance Abuse Maternal Uncle     Arthritis Maternal Grandmother     Depression Maternal Grandmother     Diabetes Maternal Grandmother     High Blood Pressure Maternal Grandmother     Mental Illness Maternal Grandmother     Stroke Maternal Grandmother     Heart Disease Maternal Grandfather     Cancer Paternal Grandmother     Diabetes Paternal Grandmother     Diabetes

## 2024-07-13 ENCOUNTER — HOSPITAL ENCOUNTER (OUTPATIENT)
Age: 49
Discharge: HOME | End: 2024-07-13
Payer: COMMERCIAL

## 2024-07-13 LAB
FERRITIN SERPL-MCNC: 13 NG/ML (ref 13–150)
IRON SATN MFR SERPL: 5 % (ref 20–55)
IRON SERPL-MCNC: 17 UG/DL (ref 37–145)
TIBC SERPL-MCNC: 376 UG/DL (ref 250–450)
UNSATURATED IRON BINDING CAPACITY: 359 UG/DL (ref 112–347)

## 2024-07-13 PROCEDURE — 82728 ASSAY OF FERRITIN: CPT

## 2024-07-13 PROCEDURE — 36415 COLL VENOUS BLD VENIPUNCTURE: CPT

## 2024-07-13 PROCEDURE — 83540 ASSAY OF IRON: CPT

## 2024-07-13 PROCEDURE — 83550 IRON BINDING TEST: CPT
